# Patient Record
Sex: FEMALE | Race: BLACK OR AFRICAN AMERICAN | NOT HISPANIC OR LATINO | Employment: UNEMPLOYED | URBAN - METROPOLITAN AREA
[De-identification: names, ages, dates, MRNs, and addresses within clinical notes are randomized per-mention and may not be internally consistent; named-entity substitution may affect disease eponyms.]

---

## 2017-01-16 ENCOUNTER — OFFICE VISIT (OUTPATIENT)
Dept: FAMILY MEDICINE | Facility: CLINIC | Age: 42
End: 2017-01-16
Payer: COMMERCIAL

## 2017-01-16 VITALS
SYSTOLIC BLOOD PRESSURE: 160 MMHG | OXYGEN SATURATION: 99 % | HEART RATE: 73 BPM | HEIGHT: 65 IN | WEIGHT: 185 LBS | TEMPERATURE: 97 F | BODY MASS INDEX: 30.82 KG/M2 | DIASTOLIC BLOOD PRESSURE: 104 MMHG

## 2017-01-16 DIAGNOSIS — F51.01 PRIMARY INSOMNIA: ICD-10-CM

## 2017-01-16 DIAGNOSIS — N70.93 TUBO-OVARIAN ABSCESS: ICD-10-CM

## 2017-01-16 DIAGNOSIS — Z23 NEED FOR PROPHYLACTIC VACCINATION AND INOCULATION AGAINST INFLUENZA: ICD-10-CM

## 2017-01-16 DIAGNOSIS — F17.200 NEEDS SMOKING CESSATION EDUCATION: ICD-10-CM

## 2017-01-16 DIAGNOSIS — T81.31XA DEHISCENCE OF INCISION, INITIAL ENCOUNTER: Primary | ICD-10-CM

## 2017-01-16 DIAGNOSIS — I10 HYPERTENSION GOAL BP (BLOOD PRESSURE) < 140/90: ICD-10-CM

## 2017-01-16 PROCEDURE — 99214 OFFICE O/P EST MOD 30 MIN: CPT | Performed by: FAMILY MEDICINE

## 2017-01-16 RX ORDER — DOXYCYCLINE 100 MG/1
100 CAPSULE ORAL 2 TIMES DAILY
Qty: 20 CAPSULE | Refills: 0 | Status: SHIPPED | OUTPATIENT
Start: 2017-01-16 | End: 2017-01-26

## 2017-01-16 RX ORDER — ESZOPICLONE 2 MG/1
2 TABLET, FILM COATED ORAL
Qty: 30 TABLET | Refills: 1 | Status: SHIPPED | OUTPATIENT
Start: 2017-01-16 | End: 2017-03-29

## 2017-01-16 NOTE — MR AVS SNAPSHOT
After Visit Summary   1/16/2017    Jennifer Gonsalez    MRN: [id number]           Patient Information     Date Of Birth          1975        Visit Information        Provider Department      1/16/2017 6:40 PM Myla Davies MD [facility]        Today's Diagnoses     Dehiscence of incision, initial encounter    -  1     Need for prophylactic vaccination and inoculation against influenza         Needs smoking cessation education         Hypertension goal BP (blood pressure) < 140/90         Primary insomnia           Care Instructions    Based on your medical history and these are the current health maintenance or preventive care services that you are due for (some may have been done at this visit)  Health Maintenance Due   Topic Date Due     INFLUENZA VACCINE (SYSTEM ASSIGNED)  09/01/2016       At [facility], we strive to deliver an exceptional experience to you, every time we see you.    If you receive a survey in the mail, please send us back your thoughts. We really do value your feedback.    Your care team's suggested websites for health information:  Www.STinser.org : Up to date and easily searchable information on multiple topics.  Www.medlineplus.gov : medication info, interactive tutorials, watch real surgeries online  Www.familydoctor.org : good info from the Academy of Family Physicians  Www.cdc.gov : public health info, travel advisories, epidemics (H1N1)  Www.aap.org : children's health info, normal development, vaccinations  Www.health.state[place] : [place] dept of health, public health issues in [place], N1N1    How to contact your care team:   Team Danielle/Spirit [phone]         Pharmacy [phone]    Dr. Mujica, Ellen Martinez PA-C, Dr. Steinberg, Erin Corrales APRN CNP, Mihaela Hobson PA-C, Dr. Rader, and Andria Mirza, CHRIS    Team RNs: Morena & Jing      Clinic hours  M-Th 7 am-7 pm   Fri 7 am-5 pm.   Urgent  "care M-F 11 am-9 pm,   Sat/Sun 9 am-5 pm.  Pharmacy M-Th 8 am-8 pm Fri 8 am-6 pm  Sat/Sun 9 am-5 pm.     All password changes, disabled accounts, or ID changes in Technitrol/MyHealth will be done by our Access Services Department.    If you need help with your account or password, call: [phone]. Clinic staff no longer has the ability to change passwords.             Follow-ups after your visit        Additional Services     [facility] Referral Smoking Cessation       [place] online at [email or web address]                  Who to contact     If you have questions or need follow up information about today's clinic visit or your schedule please contact [facility] directly at [phone].  Normal or non-critical lab and imaging results will be communicated to you by MyChart, letter or phone within 4 business days after the clinic has received the results. If you do not hear from us within 7 days, please contact the clinic through Derivixhart or phone. If you have a critical or abnormal lab result, we will notify you by phone as soon as possible.  Submit refill requests through Technitrol or call your pharmacy and they will forward the refill request to us. Please allow 3 business days for your refill to be completed.          Additional Information About Your Visit        Care EveryWhere ID     This is your Care EveryWhere ID. This could be used by other organizations to access your [facility] medical records  [id number]        Your Vitals Were     Pulse Temperature Height BMI (Body Mass Index) Pulse Oximetry Last Period    73 97  F (36.1  C) (Oral) 5' 4.7\" (1.643 m) 31.09 kg/m2 99% 09/09/2016    Breastfeeding?                   No            Blood Pressure from Last 3 Encounters:   01/16/17 160/104   12/07/16 158/100   10/13/16 163/101    Weight from Last 3 Encounters:   01/16/17 185 lb (83.915 kg)   12/07/16 187 lb 6.4 oz (85.004 kg)   10/13/16 188 lb (85.276 kg)              We Performed the " Following     NOVU Referral Smoking Cessation          Today's Medication Changes          These changes are accurate as of: 1/16/17  6:57 PM.  If you have any questions, ask your nurse or doctor.               Start taking these medicines.        Dose/Directions    chlorhexidine 4 % liquid   Commonly known as:  HIBICLENS   Used for:  Dehiscence of incision, initial encounter   Started by:  Myla Davies MD        Apply topically daily as needed for wound care   Quantity:  480 mL   Refills:  1       doxycycline 100 MG capsule   Commonly known as:  VIBRAMYCIN   Used for:  Dehiscence of incision, initial encounter   Started by:  Myla Davies MD        Dose:  100 mg   Take 1 capsule (100 mg) by mouth 2 times daily for 10 days   Quantity:  20 capsule   Refills:  0            Where to get your medicines      These medications were sent to [facility] [place] - [address]  [address], [facility] [address]     Phone:  [phone]    - chlorhexidine 4 % liquid  - doxycycline 100 MG capsule      Some of these will need a paper prescription and others can be bought over the counter.  Ask your nurse if you have questions.     Bring a paper prescription for each of these medications    - eszopiclone 2 MG tablet             Primary Care Provider Office Phone # Fax #    Myla Obrien [phone]       [facility] [address]  [facility] [address]        Thank you!     Thank you for choosing [facility]  for your care. Our goal is always to provide you with excellent care. Hearing back from our patients is one way we can continue to improve our services. Please take a few minutes to complete the written survey that you may receive in the mail after your visit with us. Thank you!             Your Updated Medication List - Protect others around you: Learn how to safely use,  store and throw away your medicines at www.disposemymeds.org.          This list is accurate as of: 1/16/17  6:57 PM.  Always use your most recent med list.                   Brand Name Dispense Instructions for use    * albuterol 108 (90 BASE) MCG/ACT Inhaler    PROAIR HFA/PROVENTIL HFA/VENTOLIN HFA    1 Inhaler    Inhale 2 puffs into the lungs every 6 hours as needed for shortness of breath / dyspnea       * albuterol (2.5 MG/3ML) 0.083% neb solution     60 vial    Take 1 vial (2.5 mg) by nebulization every 6 hours as needed for shortness of breath / dyspnea       chlorhexidine 4 % liquid    HIBICLENS    480 mL    Apply topically daily as needed for wound care       doxycycline 100 MG capsule    VIBRAMYCIN    20 capsule    Take 1 capsule (100 mg) by mouth 2 times daily for 10 days       eszopiclone 2 MG tablet    LUNESTA    30 tablet    Take 1 tablet (2 mg) by mouth nightly as needed for sleep       lisinopril-hydrochlorothiazide 10-12.5 MG per tablet    PRINZIDE/ZESTORETIC    90 tablet    Take 1 tablet by mouth every morning       * Notice:  This list has 2 medication(s) that are the same as other medications prescribed for you. Read the directions carefully, and ask your doctor or other care provider to review them with you.

## 2017-01-17 ENCOUNTER — TELEPHONE (OUTPATIENT)
Dept: FAMILY MEDICINE | Facility: CLINIC | Age: 42
End: 2017-01-17

## 2017-01-17 DIAGNOSIS — I10 HYPERTENSION GOAL BP (BLOOD PRESSURE) < 140/90: Primary | ICD-10-CM

## 2017-01-17 NOTE — PROGRESS NOTES
"  SUBJECTIVE:                                                    Jennifer Gonsalez is a 41 year old female who presents to clinic today for the following health issues:        Concern - post-op Incision       Description:   Infected incision site    Intensity: moderate    Progression of Symptoms:  same    Accompanying Signs & Symptoms:  no       Previous history of similar problem:   no    Precipitating factors:   Worsened by: n/a    Alleviating factors:  Improved by: n/a       PID with tuboovarian abscess over 6 months ago.  Has surgery and was in ICU and hospital with subsequent lung infection.  For last 1 day has had drainage from right pelvic incision.  No fever  HTN - has not been taking medication.  Insomnia - stable with prn lunesta.  No side effects.    Problem list and histories reviewed & adjusted, as indicated.  Additional history: as documented    Problem list, Medication list, Allergies, and Medical/Social/Surgical histories reviewed in EPIC and updated as appropriate.    ROS:  Constitutional, HEENT, cardiovascular, pulmonary, GI, , musculoskeletal, neuro, skin, endocrine and psych systems are negative, except as otherwise noted.    OBJECTIVE:                                                    /104 mmHg  Pulse 73  Temp(Src) 97  F (36.1  C) (Oral)  Ht 5' 4.7\" (1.643 m)  Wt 185 lb (83.915 kg)  BMI 31.09 kg/m2  SpO2 99%  LMP 09/09/2016  Breastfeeding? No  Body mass index is 31.09 kg/(m^2).  GENERAL: healthy, alert and no distress  NECK: no adenopathy, no asymmetry, masses, or scars and thyroid normal to palpation  RESP: lungs clear to auscultation - no rales, rhonchi or wheezes  CV: regular rate and rhythm, normal S1 S2, no S3 or S4, no murmur, click or rub, no peripheral edema and peripheral pulses strong  ABDOMEN: soft, nontender, no hepatosplenomegaly, no masses and bowel sounds normal  MS: no gross musculoskeletal defects noted, no edema  SKIN: yellow white drainage from right edge of " lower abdominal surgical scar.    Diagnostic Test Results:  none      ASSESSMENT/PLAN:                                                    1. Need for prophylactic vaccination and inoculation against influenza  refused    2. Needs smoking cessation education  Information given  - NOVU Referral Smoking Cessation    3. Dehiscence of incision, initial encounter  Topical cleaning and start abx.  - chlorhexidine (HIBICLENS) 4 % liquid; Apply topically daily as needed for wound care  Dispense: 480 mL; Refill: 1  - doxycycline (VIBRAMYCIN) 100 MG capsule; Take 1 capsule (100 mg) by mouth 2 times daily for 10 days  Dispense: 20 capsule; Refill: 0    4. Hypertension goal BP (blood pressure) < 140/90  Not controlled due to compliance - discuss life and disability risk.  Patient will  medication today.    5. Primary insomnia  Refilled  - eszopiclone (LUNESTA) 2 MG tablet; Take 1 tablet (2 mg) by mouth nightly as needed for sleep  Dispense: 30 tablet; Refill: 1    6. Tubo-ovarian abscess  Secondary effects.    The uses and side effects, including black box warnings as appropriate, were discussed in detail.  All patient questions were answered.  The patient was instructed to call immediately if any side effects developed.       FUTURE APPOINTMENTS:       - Follow-up visit in 3 days if not improved or in 2 weeks for ancillary BP check.    Myla Obrien MD  [facility]

## 2017-01-17 NOTE — PATIENT INSTRUCTIONS
Based on your medical history and these are the current health maintenance or preventive care services that you are due for (some may have been done at this visit)  Health Maintenance Due   Topic Date Due     INFLUENZA VACCINE (SYSTEM ASSIGNED)  09/01/2016       At [facility], we strive to deliver an exceptional experience to you, every time we see you.    If you receive a survey in the mail, please send us back your thoughts. We really do value your feedback.    Your care team's suggested websites for health information:  Www.XL Hybrids.org : Up to date and easily searchable information on multiple topics.  Www.medlineplus.gov : medication info, interactive tutorials, watch real surgeries online  Www.familydoctor.org : good info from the Academy of Family Physicians  Www.cdc.gov : public health info, travel advisories, epidemics (H1N1)  Www.aap.org : children's health info, normal development, vaccinations  Www.health.[facility].[place].us : [place] dept of health, public health issues in [place], N1N1    How to contact your care team:   Team Danielle/Spirit [phone]         Pharmacy [phone]    Dr. Mujica, Ellen Martinez PA-C, Dr. Steinberg, Erin SALAS CNP, Mihaela Hobson PA-C, Dr. Rader, and Andria Mirza CNP    Team RNs: Morena Ch      Clinic hours  M-Th 7 am-7 pm   Fri 7 am-5 pm.   Urgent care M-F 11 am-9 pm,   Sat/Sun 9 am-5 pm.  Pharmacy M-Th 8 am-8 pm Fri 8 am-6 pm  Sat/Sun 9 am-5 pm.     All password changes, disabled accounts, or ID changes in Fashiolista/MyHealth will be done by our Access Services Department.    If you need help with your account or password, call: [phone]. Clinic staff no longer has the ability to change passwords.

## 2017-01-17 NOTE — NURSING NOTE
"Chief Complaint   Patient presents with     Surgical Followup       Initial /103 mmHg  Pulse 73  Temp(Src) 97  F (36.1  C) (Oral)  Ht 5' 4.7\" (1.643 m)  Wt 185 lb (83.915 kg)  BMI 31.09 kg/m2  SpO2 99%  LMP 09/09/2016  Breastfeeding? No Estimated body mass index is 31.09 kg/(m^2) as calculated from the following:    Height as of this encounter: 5' 4.7\" (1.643 m).    Weight as of this encounter: 185 lb (83.915 kg).  BP completed using cuff size: jael Haynes MA      "

## 2017-01-17 NOTE — TELEPHONE ENCOUNTER
Prior Auth Needed  Drug: Lunesta 2mg  Insurance: [employer]  ID Number: [id number]  Phone Number: [phone]    CHARLIE PAPPAS REQ CALL [phone] MAXIMUM 025 DAYS QUANTITY OF 15.000    Please do not close encounter until Prior Auth is approved or denied    Thank You,   Benito Newman  [phone]

## 2017-01-18 NOTE — TELEPHONE ENCOUNTER
SHERLEY JACLYN ([place]: [id number])   [id number]  Lunesta 2MG tablets  Status: PA Request  Created: January 18th, 2017  Sent: January 18th, 2017  MICKY Haynes MA

## 2017-01-19 NOTE — TELEPHONE ENCOUNTER
Received a fax from [employer] to deny the PA for Lunesta.     Will forward message and fax to provider to  Review and inform if appeal to be sent.     Dina Asif CMA

## 2017-01-20 NOTE — TELEPHONE ENCOUNTER
Pt notified of note below, she verbalized understanding.  Pt is requesting a script for a bp machine, our pharmacy does not carry a bp machine.  Routing to provider to advise.  Hilary Crain MA

## 2017-01-24 ENCOUNTER — TELEPHONE (OUTPATIENT)
Dept: FAMILY MEDICINE | Facility: CLINIC | Age: 42
End: 2017-01-24

## 2017-01-24 NOTE — TELEPHONE ENCOUNTER
Just tried running script for Lunesta 2mg tabs, now the reject is saying drug is non-formulary.  MED[employer] PMAP  Member # [id number]  Phone [phone]  Thank you very kindly!  Zahida Lujan, [facility] [place]

## 2017-01-24 NOTE — TELEPHONE ENCOUNTER
PRODUCT NOT ON FORMULARY  Drug: lunesta 2mg  Insurance: MEDICA [facility]  ID: [id number]  Phone Number: [phone]    Please do not close encounter until prior auth is approved or denied.    Thank you very kindly!  Zahida Lujan [facility] [place]

## 2017-01-31 NOTE — TELEPHONE ENCOUNTER
I checked status of PA over cover my meds and additional info needed. Info added and PA request sent to [facility] via cover my meds. We should receive and outcome within 24 hours. [place], CMA

## 2017-01-31 NOTE — TELEPHONE ENCOUNTER
Received  Notice of Denial.  FOR: pharmacy Non- Formulary.  Informed pharmacy.  Sent to PCP to advise.    MICKY Haynes MA

## 2017-02-01 NOTE — TELEPHONE ENCOUNTER
Notify patient that is seems her insurance will not cover the lunesta at all.  Schedule appt to discuss next steps.  She should also contact her insurance to see what is on her formulary before the appt.

## 2017-02-27 ENCOUNTER — TELEPHONE (OUTPATIENT)
Dept: FAMILY MEDICINE | Facility: CLINIC | Age: 42
End: 2017-02-27

## 2017-02-27 NOTE — LETTER
[facility]   [address]  [facility] [address]      February 27, 2017      Jennifer Gonsalez  [address]  [facility] [address]        Dear Jennifer,      Due to your last visit with us, we would like you to come in  for a blood pressure recheck on our Ancillary Schedule.  Your health is important to us.      Please call our office or request an appointment through [employer] at your earliest convenience.  You have multiple appointment options available to you, including no cost, low cost and convenient hours.  If you are interested in one of these options, call to speak with one of our nurses or medical assistants, to see what would be the most appropriate for you.      If you have any questions or concerns, please reach out to us through [employer] or by calling our clinic at [phone].          Sincerely,    Team Spirit    [facility]

## 2017-02-28 NOTE — TELEPHONE ENCOUNTER
Panel Management Review        Summary:    Patient is due/failing the following:   BP CHECK    Action needed:   Ancillary Blood Pressure Check     Type of outreach:    Sent letter.    Questions for provider review:    None                                                                                                                                    Charlene Nj CMA

## 2017-03-13 ENCOUNTER — OFFICE VISIT (OUTPATIENT)
Dept: FAMILY MEDICINE | Facility: CLINIC | Age: 42
End: 2017-03-13
Payer: COMMERCIAL

## 2017-03-13 VITALS
OXYGEN SATURATION: 99 % | WEIGHT: 189 LBS | BODY MASS INDEX: 31.49 KG/M2 | TEMPERATURE: 97 F | HEART RATE: 68 BPM | DIASTOLIC BLOOD PRESSURE: 90 MMHG | SYSTOLIC BLOOD PRESSURE: 140 MMHG | HEIGHT: 65 IN

## 2017-03-13 DIAGNOSIS — T81.31XD WOUND DEHISCENCE, SUBSEQUENT ENCOUNTER: Primary | ICD-10-CM

## 2017-03-13 DIAGNOSIS — I10 HYPERTENSION GOAL BP (BLOOD PRESSURE) < 140/90: ICD-10-CM

## 2017-03-13 PROCEDURE — 80048 BASIC METABOLIC PNL TOTAL CA: CPT | Performed by: FAMILY MEDICINE

## 2017-03-13 PROCEDURE — 36415 COLL VENOUS BLD VENIPUNCTURE: CPT | Performed by: FAMILY MEDICINE

## 2017-03-13 PROCEDURE — 99214 OFFICE O/P EST MOD 30 MIN: CPT | Performed by: FAMILY MEDICINE

## 2017-03-13 NOTE — LETTER
[facility]   [address]  [place] [address]      March 16, 2017      Jennifer Gonsalez  [address]  [place] [address]            Ms. Gonsalez,    All of your labs were normal for you.    Please contact the clinic if you have additional questions.  Thank you.    Sincerely,    Myla Obrien/ak        Results for orders placed or performed in visit on 03/13/17   BASIC METABOLIC PANEL   Result Value Ref Range    Sodium 141 133 - 144 mmol/L    Potassium 3.7 3.4 - 5.3 mmol/L    Chloride 104 94 - 109 mmol/L    Carbon Dioxide 28 20 - 32 mmol/L    Anion Gap 9 3 - 14 mmol/L    Glucose 87 70 - 99 mg/dL    Urea Nitrogen 12 7 - 30 mg/dL    Creatinine 0.76 0.52 - 1.04 mg/dL    GFR Estimate 84 >60 mL/min/1.7m2    GFR Estimate If Black >90   GFR Calc   >60 mL/min/1.7m2    Calcium 9.6 8.5 - 10.1 mg/dL

## 2017-03-13 NOTE — NURSING NOTE
"Chief Complaint   Patient presents with     Hypertension       Initial /90  Pulse 68  Temp 97  F (36.1  C) (Oral)  Ht 5' 4.7\" (1.643 m)  Wt 189 lb (85.7 kg)  LMP 09/09/2016  SpO2 99%  Breastfeeding? No  BMI 31.74 kg/m2 Estimated body mass index is 31.74 kg/(m^2) as calculated from the following:    Height as of this encounter: 5' 4.7\" (1.643 m).    Weight as of this encounter: 189 lb (85.7 kg).  Medication Reconciliation: complete     Charlene Nj CMA      "

## 2017-03-13 NOTE — MR AVS SNAPSHOT
After Visit Summary   3/13/2017    Jennifer Gonsalez    MRN: [id number]           Patient Information     Date Of Birth          1975        Visit Information        Provider Department      3/13/2017 6:20 PM Myla Davies MD [facility]        Today's Diagnoses     Wound dehiscence, subsequent encounter    -  1    Hypertension goal BP (blood pressure) < 140/90           Follow-ups after your visit        Additional Services     GENERAL SURG ADULT REFERRAL       Your provider has referred you to: FM[facility]: [facility] [phone]   [email or web address]    Please be aware that coverage of these services is subject to the terms and limitations of your health insurance plan.  Call member services at your health plan with any benefit or coverage questions.      Please bring the following with you to your appointment:    (1) Any X-Rays, CTs or MRIs which have been performed.  Contact the facility where they were done to arrange for  prior to your scheduled appointment.   (2) List of current medications   (3) This referral request   (4) Any documents/labs given to you for this referral                  Follow-up notes from your care team     Return in about 2 weeks (around 3/27/2017) for BP Recheck.      Who to contact     If you have questions or need follow up information about today's clinic visit or your schedule please contact [facility] directly at [phone].  Normal or non-critical lab and imaging results will be communicated to you by MyChart, letter or phone within 4 business days after the clinic has received the results. If you do not hear from us within 7 days, please contact the clinic through MyChart or phone. If you have a critical or abnormal lab result, we will notify you by phone as soon as possible.  Submit refill requests through Spinel[employer] or call your pharmacy and  "they will forward the refill request to us. Please allow 3 business days for your refill to be completed.          Additional Information About Your Visit        MyChart Information     Scint-X lets you send messages to your doctor, view your test results, renew your prescriptions, schedule appointments and more. To sign up, go to [email or web address] . Click on \"Log in\" on the left side of the screen, which will take you to the Welcome page. Then click on \"Sign up Now\" on the right side of the page.     You will be asked to enter the access code listed below, as well as some personal information. Please follow the directions to create your username and password.     Your access code is: [email or web address]  Expires: 2017  7:11 PM     Your access code will  in 90 days. If you need help or a new code, please call your [place] clinic or [phone].        Care EveryWhere ID     This is your [facility] EveryWhere ID. This could be used by other organizations to access your [place] medical records  [id number]        Your Vitals Were     Pulse Temperature Height Last Period Pulse Oximetry Breastfeeding?    68 97  F (36.1  C) (Oral) 5' 4.7\" (1.643 m) 2016 99% No    BMI (Body Mass Index)                   31.74 kg/m2            Blood Pressure from Last 3 Encounters:   17 140/90   17 (!) 160/104   16 (!) 158/100    Weight from Last 3 Encounters:   17 189 lb (85.7 kg)   17 185 lb (83.9 kg)   16 187 lb 6.4 oz (85 kg)              We Performed the Following     BASIC METABOLIC PANEL     GENERAL SURG ADULT REFERRAL        Primary Care Provider Office Phone # Fax #    Myla Obrien [phone]       [facility] [address]  [facility] [address]        Thank you!     Thank you for choosing [facility]  for your care. Our goal is always to provide you with excellent care. Hearing back from our patients is " one way we can continue to improve our services. Please take a few minutes to complete the written survey that you may receive in the mail after your visit with us. Thank you!             Your Updated Medication List - Protect others around you: Learn how to safely use, store and throw away your medicines at www.disposemymeds.org.          This list is accurate as of: 3/13/17  7:11 PM.  Always use your most recent med list.                   Brand Name Dispense Instructions for use    * albuterol 108 (90 BASE) MCG/ACT Inhaler    PROAIR HFA/PROVENTIL HFA/VENTOLIN HFA    1 Inhaler    Inhale 2 puffs into the lungs every 6 hours as needed for shortness of breath / dyspnea       * albuterol (2.5 MG/3ML) 0.083% neb solution     60 vial    Take 1 vial (2.5 mg) by nebulization every 6 hours as needed for shortness of breath / dyspnea       chlorhexidine 4 % liquid    HIBICLENS    480 mL    Apply topically daily as needed for wound care       eszopiclone 2 MG tablet    LUNESTA    30 tablet    Take 1 tablet (2 mg) by mouth nightly as needed for sleep       lisinopril-hydrochlorothiazide 10-12.5 MG per tablet    PRINZIDE/ZESTORETIC    90 tablet    Take 1 tablet by mouth every morning       order for DME     1 Device    Equipment being ordered: Blood pressure monitor.       * Notice:  This list has 2 medication(s) that are the same as other medications prescribed for you. Read the directions carefully, and ask your doctor or other care provider to review them with you.

## 2017-03-13 NOTE — PROGRESS NOTES
SUBJECTIVE:                                                    Jennifer Gonsalez is a 41 year old female who presents to clinic today for the following health issues:      Hypertension Follow-up      Outpatient blood pressures are not being checked.    Low Salt Diet: no added salt       Amount of exercise or physical activity: None    Problems taking medications regularly: No    Medication side effects: none  Diet: regular (no restrictions)    Concern: incision is still leaking on abdomen for months.      Problem list and histories reviewed & adjusted, as indicated.  Additional history: as documented    Patient Active Problem List   Diagnosis     Mild persistent asthma     CARDIOVASCULAR SCREENING; LDL GOAL LESS THAN 160     GERD (gastroesophageal reflux disease)     Hypertension goal BP (blood pressure) < 140/90     Seasonal allergic rhinitis     Acute reaction to stress     Tobacco use disorder     Non morbid obesity due to excess calories     Submental mass     Empyema (H)     Hormone replacement therapy (HRT)     Primary insomnia     Other iron deficiency anemia     Obesity with body mass index 30 or greater     Past Surgical History   Procedure Laterality Date     Mammoplasty reduction  2000     [place]     Hernia repair, umbilical  2014     Cholecystectomy, laporoscopic  2014     Cholecystectomy, Laparoscopic     C excis uterine fibroid,abd apprch  2014     Salpingo-oophorectomy bilateral  2016     Open BSO for bilateral TOA     Thoracentesis  2016     inflammatory pleural infusion       Social History   Substance Use Topics     Smoking status: Current Some Day Smoker     Packs/day: 0.20     Types: Cigarettes     Smokeless tobacco: Never Used      Comment: 1 cig per day     Alcohol use 0.0 oz/week     0 Standard drinks or equivalent per week      Comment: not often     History reviewed. No pertinent family history.        Reviewed and updated as needed this visit by clinical staff       Reviewed and updated as  "needed this visit by Provider         ROS:  Constitutional, HEENT, cardiovascular, pulmonary, gi and gu systems are negative, except as otherwise noted.    OBJECTIVE:                                                    /90  Pulse 68  Temp 97  F (36.1  C) (Oral)  Ht 5' 4.7\" (1.643 m)  Wt 189 lb (85.7 kg)  LMP 09/09/2016  SpO2 99%  Breastfeeding? No  BMI 31.74 kg/m2  Body mass index is 31.74 kg/(m^2).  GENERAL: healthy, alert and no distress  NECK: no adenopathy, no asymmetry, masses, or scars and thyroid normal to palpation  RESP: lungs clear to auscultation - no rales, rhonchi or wheezes  CV: regular rate and rhythm, normal S1 S2, no S3 or S4, no murmur, click or rub, no peripheral edema and peripheral pulses strong  ABDOMEN: soft, nontender, without hepatosplenomegaly or masses, bowel sounds normal and open area on right lower abd with tissue sticking out  MS: no gross musculoskeletal defects noted, no edema    Diagnostic Test Results:  none      ASSESSMENT/PLAN:                                                    1. Wound dehiscence, subsequent encounter  Referral for treatment  - GENERAL SURG ADULT REFERRAL    2. Hypertension goal BP (blood pressure) < 140/90  Check labs and restart medication.  - BASIC METABOLIC PANEL    FUTURE APPOINTMENTS:       - Follow-up visit in 2 weeks for bp recheck.    Myla Obrien MD  [facility]    "

## 2017-03-14 LAB
ANION GAP SERPL CALCULATED.3IONS-SCNC: 9 MMOL/L (ref 3–14)
BUN SERPL-MCNC: 12 MG/DL (ref 7–30)
CALCIUM SERPL-MCNC: 9.6 MG/DL (ref 8.5–10.1)
CHLORIDE SERPL-SCNC: 104 MMOL/L (ref 94–109)
CO2 SERPL-SCNC: 28 MMOL/L (ref 20–32)
CREAT SERPL-MCNC: 0.76 MG/DL (ref 0.52–1.04)
GFR SERPL CREATININE-BSD FRML MDRD: 84 ML/MIN/1.7M2
GLUCOSE SERPL-MCNC: 87 MG/DL (ref 70–99)
POTASSIUM SERPL-SCNC: 3.7 MMOL/L (ref 3.4–5.3)
SODIUM SERPL-SCNC: 141 MMOL/L (ref 133–144)

## 2017-03-14 ASSESSMENT — ASTHMA QUESTIONNAIRES: ACT_TOTALSCORE: 23

## 2017-03-16 ENCOUNTER — OFFICE VISIT (OUTPATIENT)
Dept: SURGERY | Facility: CLINIC | Age: 42
End: 2017-03-16
Payer: COMMERCIAL

## 2017-03-16 ENCOUNTER — RADIANT APPOINTMENT (OUTPATIENT)
Dept: CT IMAGING | Facility: CLINIC | Age: 42
End: 2017-03-16
Attending: SURGERY
Payer: COMMERCIAL

## 2017-03-16 VITALS
HEART RATE: 87 BPM | WEIGHT: 187 LBS | HEIGHT: 64 IN | DIASTOLIC BLOOD PRESSURE: 97 MMHG | SYSTOLIC BLOOD PRESSURE: 163 MMHG | BODY MASS INDEX: 31.92 KG/M2

## 2017-03-16 DIAGNOSIS — L98.8 DRAINING CUTANEOUS SINUS TRACT: Primary | ICD-10-CM

## 2017-03-16 DIAGNOSIS — L98.8 DRAINING CUTANEOUS SINUS TRACT: ICD-10-CM

## 2017-03-16 PROCEDURE — 99243 OFF/OP CNSLTJ NEW/EST LOW 30: CPT | Performed by: SURGERY

## 2017-03-16 PROCEDURE — 74177 CT ABD & PELVIS W/CONTRAST: CPT | Performed by: RADIOLOGY

## 2017-03-16 RX ORDER — IOPAMIDOL 755 MG/ML
115 INJECTION, SOLUTION INTRAVASCULAR ONCE
Status: COMPLETED | OUTPATIENT
Start: 2017-03-16 | End: 2017-03-16

## 2017-03-16 RX ADMIN — IOPAMIDOL 115 ML: 755 INJECTION, SOLUTION INTRAVASCULAR at 15:18

## 2017-03-16 NOTE — MR AVS SNAPSHOT
"              After Visit Summary   3/16/2017    Jennifer Gonsalez    MRN: [id number]           Patient Information     Date Of Birth          1975        Visit Information        Provider Department      3/16/2017 9:00 AM Rakan Mcclain DO [facility]        Today's Diagnoses     Draining cutaneous sinus tract    -  1       Follow-ups after your visit        Who to contact     If you have questions or need follow up information about today's clinic visit or your schedule please contact [facility] directly at [phone].  Normal or non-critical lab and imaging results will be communicated to you by [employer], letter or phone within 4 business days after the clinic has received the results. If you do not hear from us within 7 days, please contact the clinic through [employer] or phone. If you have a critical or abnormal lab result, we will notify you by phone as soon as possible.  Submit refill requests through [employer] or call your pharmacy and they will forward the refill request to us. Please allow 3 business days for your refill to be completed.          Additional Information About Your Visit        MyChart Information     [employer] lets you send messages to your doctor, view your test results, renew your prescriptions, schedule appointments and more. To sign up, go to [email or web address] . Click on \"Log in\" on the left side of the screen, which will take you to the Welcome page. Then click on \"Sign up Now\" on the right side of the page.     You will be asked to enter the access code listed below, as well as some personal information. Please follow the directions to create your username and password.     Your access code is: [email or web address]  Expires: 2017  7:11 PM     Your access code will  in 90 days. If you need help or a new code, please call your [facility] or [phone].        Care EveryWhere ID     This is your Care EveryWhere ID. This could be used " "by other organizations to access your [facility] medical records  [id number]        Your Vitals Were     Pulse Height Last Period BMI (Body Mass Index)          87 1.626 m (5' 4\") 09/09/2016 32.1 kg/m2         Blood Pressure from Last 3 Encounters:   03/16/17 (!) 163/97   03/13/17 140/90   01/16/17 (!) 160/104    Weight from Last 3 Encounters:   03/16/17 84.8 kg (187 lb)   03/13/17 85.7 kg (189 lb)   01/16/17 83.9 kg (185 lb)               Primary Care Provider Office Phone # Fax #    Myla Shawna Obrien [phone]       [facility] [address] [place]  [facility] [phone]        Thank you!     Thank you for choosing [facility]  for your care. Our goal is always to provide you with excellent care. Hearing back from our patients is one way we can continue to improve our services. Please take a few minutes to complete the written survey that you may receive in the mail after your visit with us. Thank you!             Your Updated Medication List - Protect others around you: Learn how to safely use, store and throw away your medicines at www.disposemymeds.org.          This list is accurate as of: 3/16/17 11:59 PM.  Always use your most recent med list.                   Brand Name Dispense Instructions for use    * albuterol 108 (90 BASE) MCG/ACT Inhaler    PROAIR HFA/PROVENTIL HFA/VENTOLIN HFA    1 Inhaler    Inhale 2 puffs into the lungs every 6 hours as needed for shortness of breath / dyspnea       * albuterol (2.5 MG/3ML) 0.083% neb solution     60 vial    Take 1 vial (2.5 mg) by nebulization every 6 hours as needed for shortness of breath / dyspnea       chlorhexidine 4 % liquid    HIBICLENS    480 mL    Apply topically daily as needed for wound care       eszopiclone 2 MG tablet    LUNESTA    30 tablet    Take 1 tablet (2 mg) by mouth nightly as needed for sleep       lisinopril-hydrochlorothiazide 10-12.5 MG per tablet    PRINZIDE/ZESTORETIC    " 90 tablet    Take 1 tablet by mouth every morning       order for DME     1 Device    Equipment being ordered: Blood pressure monitor.       * Notice:  This list has 2 medication(s) that are the same as other medications prescribed for you. Read the directions carefully, and ask your doctor or other care provider to review them with you.

## 2017-03-16 NOTE — PROGRESS NOTES
Ms. Gonsalez,    All of your labs were normal for you.    Please contact the clinic if you have additional questions.  Thank you.    Sincerely,    Myla Obrien

## 2017-03-16 NOTE — NURSING NOTE
"Chief Complaint   Patient presents with     Abdominal Pain       Initial BP (!) 163/97  Pulse 87  Ht 5' 4\" (1.626 m)  Wt 187 lb (84.8 kg)  LMP 09/09/2016  BMI 32.1 kg/m2 Estimated body mass index is 32.1 kg/(m^2) as calculated from the following:    Height as of this encounter: 5' 4\" (1.626 m).    Weight as of this encounter: 187 lb (84.8 kg).  Medication Reconciliation: complete   Rhianna Salas Cma      "

## 2017-03-16 NOTE — Clinical Note
I had the pleasure of seeing Jennifer Gonsalez in the clinic today for her chronic draining sinus tract. She had a CT and has a fistula. She is scheduled for a follow up with me in a week and we will see how she wishes to proceed. I will let you know what she decides.  Thanks for allowing me the chance to participate in her care.  Sincerely,  Rakan Mcclain  General Surgery  Head and Neck/Endocrine Surgery

## 2017-03-16 NOTE — PROGRESS NOTES
I was asked to see Jennifer Gonsalez for chronic draining sinus by Myla Davies    HPI:  Patient is a 41 year old female with complaints of chronic draining sinus    Had fibroid surgery about 2 yrs ago. Pt developed an infection and had to have both ovaries removed in May. Pt developed pneumonia the day after being discharged and was in the hospital for two weeks. Pt developed a discharging wound on right corner of incision. Pt was taken to the OR and a drain was placed. Drain was removed a week later in June. Wound started leaking again in November.     Pt had a small subcentimeter stitch that came out of the wound sometime last year.    Greenish red fluid comes out. Sometimes it is just blood. Fluid is malodorous. Increased discharge when patient is very active. Discharge has been relatively steady. Wound is tender to touch. Pt has occasional generalized abdominal pain crampy.     Review Of Systems    General: Positive for occasional fever or chills  Skin: negative for masses or rashes  Ears/Nose/Throat: negative for, epistaxis, bleeding gums  Respiratory: No shortness of breath, dyspnea on exertion, cough, or hemoptysis  Cardiovascular: negative for and chest pain  Gastrointestinal: Positive for occasional nausea, and abdominal pain  Genitourinary: negative for and frequency  Neurologic: negative for focal weakness  Hematologic/Lymphatic/Immunologic: negative for, bleeding disorder and frequent infections  Endocrine: negative for, diabetes, polydipsia and polyuria      Past Medical History   Diagnosis Date     Chlamydia 2014     Dysmenorrhea      H/O transfusion of packed red blood cells 2016     HTN (hypertension), benign 2014     Leiomyoma of uterus 2013     Mild intermittent asthma      Obesity      TOA (tubo-ovarian abscess) 2016     Tobacco use disorder        Past Surgical History   Procedure Laterality Date     Mammoplasty reduction  2000     [place]     Hernia repair, umbilical  2014      Cholecystectomy, laporoscopic  2014     Cholecystectomy, Laparoscopic     [facility]     Salpingo-oophorectomy bilateral  2016     Open BSO for bilateral TOA     Thoracentesis  2016     inflammatory pleural infusion       Social History     Social History     Marital status: Single     Spouse name: angelina Keen     Number of children: 3     Years of education: N/A     Occupational History     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 No     Social History Narrative       Current Outpatient Prescriptions   Medication Sig Dispense Refill     order for DME Equipment being ordered: Blood pressure monitor. (Patient not taking: Reported on 3/13/2017) 1 Device 0     chlorhexidine (HIBICLENS) 4 % liquid Apply topically daily as needed for wound care (Patient not taking: Reported on 3/13/2017) 480 mL 1     eszopiclone (LUNESTA) 2 MG tablet Take 1 tablet (2 mg) by mouth nightly as needed for sleep (Patient not taking: Reported on 3/13/2017) 30 tablet 1     lisinopril-hydrochlorothiazide (PRINZIDE/ZESTORETIC) 10-12.5 MG per tablet Take 1 tablet by mouth every morning (Patient not taking: Reported on 3/13/2017) 90 tablet 1     albuterol (PROAIR HFA, PROVENTIL HFA, VENTOLIN HFA) 108 (90 BASE) MCG/ACT inhaler Inhale 2 puffs into the lungs every 6 hours as needed for shortness of breath / dyspnea 1 Inhaler 5     albuterol (2.5 MG/3ML) 0.083% nebulizer solution Take 1 vial (2.5 mg) by nebulization every 6 hours as needed for shortness of breath / dyspnea (Patient not taking: Reported on  "3/13/2017) 60 vial 6       Medications and history reviewed    Physical exam:  Vitals: BP (!) 163/97  Pulse 87  Ht 1.626 m (5' 4\")  Wt 84.8 kg (187 lb)  LMP 09/09/2016  BMI 32.1 kg/m2  BMI= Body mass index is 32.1 kg/(m^2).    Constitutional: healthy, alert and no distress  Eyes: Pupils round and equal, no icterus   ENT: Mucous membranes moist  Respiratory:  Non-labored respiration  Gastrointestinal: Abdomen soft, non-tender. BS normal. No masses, organomegaly  Incision is wel healed with a small 2mm opening on the right lateral aspect. Wound was probed and was about 3mm deep with no foreign material noted.  Musculoskeletal: No gross deformity  Neurologic: No gross focal deficits  Psychiatric: mentation appears normal and affect normal/bright  Hematologic/Lymphatic/Immunologic: No bruising noted  Skin: No lesions, rashes, erythema or jaundice noted      Assessment: Chronic draining sinus  Plan:   CT scan  Follow up in 2 weeks    Rakan Mcclain DO    "

## 2017-03-17 ENCOUNTER — TELEPHONE (OUTPATIENT)
Dept: FAMILY MEDICINE | Facility: CLINIC | Age: 42
End: 2017-03-17

## 2017-03-17 NOTE — TELEPHONE ENCOUNTER
Reason for Call:  Request for results:    Name of test or procedure: CT Abdomen Pelvis w Contrast    Date of test of procedure: 03/16/17    Location of the test or procedure: [facility]    OK to leave the result message on voice mail or with a family member? YES    Phone number Patient can be reached at:  Home number on file [phone] (home)    Additional comments: Pt calling for she came in for a CT scan results and would like a call back to discuss results.    Call taken on 3/17/2017 at 8:24 AM by Jean Menezes

## 2017-03-17 NOTE — TELEPHONE ENCOUNTER
Called and informed the patient of the information below. She will call and leave a message for Dr Mcclain to advise further.    Jing Johnson RN, [facility]

## 2017-03-17 NOTE — TELEPHONE ENCOUNTER
Routing this to Dr Mcclain but this should also go to his team as ordered by him.  CT appears to show some of her bowel is close to the skin and she may need surgery to correct this.

## 2017-03-23 ENCOUNTER — OFFICE VISIT (OUTPATIENT)
Dept: SURGERY | Facility: CLINIC | Age: 42
End: 2017-03-23
Payer: COMMERCIAL

## 2017-03-23 VITALS
SYSTOLIC BLOOD PRESSURE: 158 MMHG | DIASTOLIC BLOOD PRESSURE: 106 MMHG | BODY MASS INDEX: 32.44 KG/M2 | WEIGHT: 190 LBS | HEIGHT: 64 IN | HEART RATE: 64 BPM

## 2017-03-23 DIAGNOSIS — K63.2 COLOCUTANEOUS FISTULA: Primary | ICD-10-CM

## 2017-03-23 PROCEDURE — 99214 OFFICE O/P EST MOD 30 MIN: CPT | Performed by: SURGERY

## 2017-03-23 NOTE — NURSING NOTE
"Chief Complaint   Patient presents with     CT Results       Initial BP (!) 158/106  Pulse 64  Ht 5' 4\" (1.626 m)  Wt 190 lb (86.2 kg)  LMP 09/09/2016  BMI 32.61 kg/m2 Estimated body mass index is 32.61 kg/(m^2) as calculated from the following:    Height as of this encounter: 5' 4\" (1.626 m).    Weight as of this encounter: 190 lb (86.2 kg).  Medication Reconciliation: complete   Rhianna Salas Cma      "

## 2017-03-23 NOTE — PROGRESS NOTES
HPI: Patient is a 41 year old female here for follow up after a CT abd/pelvis. She had a fistula tract noted on CT.      Had fibroid surgery about 2 yrs ago. Pt developed an infection and had to have both ovaries removed in May. Pt developed pneumonia the day after being discharged and was in the hospital for two weeks. Pt developed a discharging wound on right corner of incision. Pt was taken to the OR and a drain was placed. Drain was removed a week later in June. Wound started leaking again in November.      Pt had a small subcentimeter stitch that came out of the wound sometime last year.     Greenish red fluid comes out. Sometimes it is just blood. Fluid is malodorous. Increased discharge when patient is very active. Discharge has been relatively steady. Wound is tender to touch. Pt has occasional generalized abdominal pain crampy.     General: negative for fever or chills  Skin: negative except wound as noted above  Ears/Nose/Throat: negative for, epistaxis, bleeding gums  Respiratory: No shortness of breath, dyspnea on exertion, cough, or hemoptysis  Cardiovascular: negative for and chest pain  Gastrointestinal: negative except as above  Genitourinary: negative for and frequency  Neurologic: negative for and local weakness  Hematologic/Lymphatic/Immunologic: negative for, bleeding disorder and frequent infections  Endocrine: negative for, diabetes, polydipsia and polyuria    Past Medical History:   Diagnosis Date     Chlamydia 2014     Dysmenorrhea      H/O transfusion of packed red blood cells 2016     HTN (hypertension), benign 2014     Leiomyoma of uterus 2013     Mild intermittent asthma      Obesity      TOA (tubo-ovarian abscess) 2016     Tobacco use disorder        Past Surgical History:   Procedure Laterality Date     C EXCIS UTERINE FIBROID,ABD APPRCH  2014     CHOLECYSTECTOMY, LAPOROSCOPIC  2014    Cholecystectomy, Laparoscopic     HERNIA REPAIR, UMBILICAL  2014     MAMMOPLASTY REDUCTION  2000     "[place]     SALPINGO-OOPHORECTOMY BILATERAL  2016    Open BSO for bilateral TOA     THORACENTESIS  2016    inflammatory pleural infusion       Social History     Social History     Marital status: Single     Spouse name: angelina Keen     Number of children: 3     Years of education: N/A     Occupational History     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 No     Social History Narrative       Current Outpatient Prescriptions   Medication Sig Dispense Refill     order for DME Equipment being ordered: Blood pressure monitor. (Patient not taking: Reported on 3/13/2017) 1 Device 0     chlorhexidine (HIBICLENS) 4 % liquid Apply topically daily as needed for wound care (Patient not taking: Reported on 3/13/2017) 480 mL 1     eszopiclone (LUNESTA) 2 MG tablet Take 1 tablet (2 mg) by mouth nightly as needed for sleep (Patient not taking: Reported on 3/13/2017) 30 tablet 1     lisinopril-hydrochlorothiazide (PRINZIDE/ZESTORETIC) 10-12.5 MG per tablet Take 1 tablet by mouth every morning (Patient not taking: Reported on 3/13/2017) 90 tablet 1     albuterol (PROAIR HFA, PROVENTIL HFA, VENTOLIN HFA) 108 (90 BASE) MCG/ACT inhaler Inhale 2 puffs into the lungs every 6 hours as needed for shortness of breath / dyspnea 1 Inhaler 5     albuterol (2.5 MG/3ML) 0.083% nebulizer solution Take 1 vial (2.5 mg) by nebulization every 6 hours as needed for shortness of breath / dyspnea (Patient not taking: Reported on 3/13/2017) 60 vial 6       Physical exam:   BP (!) 158/106  Pulse 64  Ht 1.626 m (5' 4\")  Wt 86.2 kg (190 " lb)  LMP 09/09/2016  BMI 32.61 kg/m2    Constitutional: healthy, alert and no distress  Eyes: Pupils round and equal, no icterus   ENT: Mucous membranes moist  Respiratory: Non-labored respiration  Gastrointestinal: Abdomen soft, non-tender. BS normal. No masses, organomegaly  Incision is wel healed with a small 2mm opening on the right lateral aspect. Skin surrounding the tract is soft.   Musculoskeletal: No gross deformity  Neurologic: No gross focal deficits  Psychiatric: mentation appears normal and affect normal/bright  Hematologic/Lymphatic/Immunologic: No bruising noted  Skin: No lesions, rashes, erythema or jaundice noted      CT scan show:  1. Colocutaneous fistula. An intermediate attenuation nodule adjacent  to the fistulous tract in the uterus may be related to the patient's  prior uterine surgery or may reflect a proteinaceous fluid collection.  2. Myomatous uterus.    Assessment: Colocutaneous fistula  Plan to do:     We will do a laparoscopic sigmoid resection with removal of fistula tract if and when the patient elects. We also discussed further observation and discussed how the likely long the fistula would close spontaneously at this point was low.     The risks benefits and alternatives of surgery were discussed with the patient including but not limited to pain, bleeding, infection, risks of general anesthesia (i.e. MI, CVA, PE, and death), and cosmetic deformity. Additionally we discussed the risk of anastomotic leak, further fistulas, removal of uterus and open surgery.     Rakan Mcclain

## 2017-03-23 NOTE — MR AVS SNAPSHOT
"              After Visit Summary   3/23/2017    Jennifer Gonsalez    MRN: [id number]           Patient Information     Date Of Birth          1975        Visit Information        Provider Department      3/23/2017 1:30 PM Rakan Mcclain DO [facility]        Today's Diagnoses     Colocutaneous fistula    -  1       Follow-ups after your visit        Who to contact     If you have questions or need follow up information about today's clinic visit or your schedule please contact [facility] directly at [phone].  Normal or non-critical lab and imaging results will be communicated to you by [employer], letter or phone within 4 business days after the clinic has received the results. If you do not hear from us within 7 days, please contact the clinic through [employer] or phone. If you have a critical or abnormal lab result, we will notify you by phone as soon as possible.  Submit refill requests through [employer] or call your pharmacy and they will forward the refill request to us. Please allow 3 business days for your refill to be completed.          Additional Information About Your Visit        MyChart Information     [employer] lets you send messages to your doctor, view your test results, renew your prescriptions, schedule appointments and more. To sign up, go to [email or web address] . Click on \"Log in\" on the left side of the screen, which will take you to the Welcome page. Then click on \"Sign up Now\" on the right side of the page.     You will be asked to enter the access code listed below, as well as some personal information. Please follow the directions to create your username and password.     Your access code is: [email or web address]  Expires: 2017  7:11 PM     Your access code will  in 90 days. If you need help or a new code, please call your [facility] or [phone].        Care EveryWhere ID     This is your Care EveryWhere ID. This could be used by other " "organizations to access your [facility] medical records  [id number]        Your Vitals Were     Pulse Height Last Period BMI (Body Mass Index)          64 1.626 m (5' 4\") 09/09/2016 32.61 kg/m2         Blood Pressure from Last 3 Encounters:   03/23/17 (!) 158/106   03/16/17 (!) 163/97   03/13/17 140/90    Weight from Last 3 Encounters:   03/23/17 86.2 kg (190 lb)   03/16/17 84.8 kg (187 lb)   03/13/17 85.7 kg (189 lb)              Today, you had the following     No orders found for display       Primary Care Provider Office Phone # Fax #    Myla Shawna Obrien [phone]       [facility] [address] [place]  [facility] [address]        Thank you!     Thank you for choosing [facility]  for your care. Our goal is always to provide you with excellent care. Hearing back from our patients is one way we can continue to improve our services. Please take a few minutes to complete the written survey that you may receive in the mail after your visit with us. Thank you!             Your Updated Medication List - Protect others around you: Learn how to safely use, store and throw away your medicines at www.disposemymeds.org.          This list is accurate as of: 3/23/17  2:53 PM.  Always use your most recent med list.                   Brand Name Dispense Instructions for use    * albuterol 108 (90 BASE) MCG/ACT Inhaler    PROAIR HFA/PROVENTIL HFA/VENTOLIN HFA    1 Inhaler    Inhale 2 puffs into the lungs every 6 hours as needed for shortness of breath / dyspnea       * albuterol (2.5 MG/3ML) 0.083% neb solution     60 vial    Take 1 vial (2.5 mg) by nebulization every 6 hours as needed for shortness of breath / dyspnea       chlorhexidine 4 % liquid    HIBICLENS    480 mL    Apply topically daily as needed for wound care       eszopiclone 2 MG tablet    LUNESTA    30 tablet    Take 1 tablet (2 mg) by mouth nightly as needed for sleep       " lisinopril-hydrochlorothiazide 10-12.5 MG per tablet    PRINZIDE/ZESTORETIC    90 tablet    Take 1 tablet by mouth every morning       order for DME     1 Device    Equipment being ordered: Blood pressure monitor.       * Notice:  This list has 2 medication(s) that are the same as other medications prescribed for you. Read the directions carefully, and ask your doctor or other care provider to review them with you.

## 2017-03-23 NOTE — Clinical Note
I had the pleasure of seeing Jennifer Gonsalez in the clinic today for her chronic draining sinus. 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 She is thinking about surgery and will get back to us when she decides.   Thanks for allowing me the chance to participate in her care.  Sincerely,  Rakan Mcclain  General Surgery  Head and Neck/Endocrine Surgery

## 2017-03-24 ENCOUNTER — TELEPHONE (OUTPATIENT)
Dept: SURGERY | Facility: CLINIC | Age: 42
End: 2017-03-24

## 2017-03-24 NOTE — TELEPHONE ENCOUNTER
Returning patients call spoke with her and scheduled her for [place]07/[place] [place] am just verifying with Dr. Mcclain and Dr. Castillo that the date and time is ok with their schedules.[facility]

## 2017-03-29 ENCOUNTER — OFFICE VISIT (OUTPATIENT)
Dept: FAMILY MEDICINE | Facility: CLINIC | Age: 42
End: 2017-03-29
Payer: COMMERCIAL

## 2017-03-29 ENCOUNTER — TELEPHONE (OUTPATIENT)
Dept: PHARMACY | Facility: CLINIC | Age: 42
End: 2017-03-29

## 2017-03-29 VITALS
HEART RATE: 74 BPM | TEMPERATURE: 97 F | DIASTOLIC BLOOD PRESSURE: 86 MMHG | RESPIRATION RATE: 17 BRPM | HEIGHT: 64 IN | BODY MASS INDEX: 32.27 KG/M2 | SYSTOLIC BLOOD PRESSURE: 138 MMHG | OXYGEN SATURATION: 100 % | WEIGHT: 189 LBS

## 2017-03-29 DIAGNOSIS — F17.200 TOBACCO USE DISORDER: ICD-10-CM

## 2017-03-29 DIAGNOSIS — K63.2 ENTEROCUTANEOUS FISTULA: ICD-10-CM

## 2017-03-29 DIAGNOSIS — J45.30 MILD PERSISTENT ASTHMA WITHOUT COMPLICATION: ICD-10-CM

## 2017-03-29 DIAGNOSIS — F51.01 PRIMARY INSOMNIA: Primary | ICD-10-CM

## 2017-03-29 DIAGNOSIS — Z01.818 PREOP GENERAL PHYSICAL EXAM: Primary | ICD-10-CM

## 2017-03-29 DIAGNOSIS — I10 HYPERTENSION GOAL BP (BLOOD PRESSURE) < 140/90: ICD-10-CM

## 2017-03-29 DIAGNOSIS — F51.01 PRIMARY INSOMNIA: ICD-10-CM

## 2017-03-29 PROCEDURE — 99214 OFFICE O/P EST MOD 30 MIN: CPT | Performed by: FAMILY MEDICINE

## 2017-03-29 RX ORDER — ESZOPICLONE 2 MG/1
2 TABLET, FILM COATED ORAL
Qty: 30 TABLET | Refills: 1 | Status: SHIPPED | OUTPATIENT
Start: 2017-03-29 | End: 2017-03-30

## 2017-03-29 ASSESSMENT — PAIN SCALES - GENERAL: PAINLEVEL: NO PAIN (0)

## 2017-03-29 NOTE — PROGRESS NOTES
[address] [address]  [phone]  Dept: [phone]    PRE-OP EVALUATION:  Today's date: 3/29/2017    Jennifer Gonsalez (: 1975) presents for pre-operative evaluation assessment as requested by Dr. Castillo and Johny.  She requires evaluation and anesthesia risk assessment prior to undergoing surgery/procedure for treatment of Colocutaneous fistula  .  Proposed procedure: fistulectomy    Date of Surgery/ Procedure: 17  Time of Surgery/ Procedure: 7:30am  Hospital/Surgical Facility: [facility]  Fax number for surgical facility: available electronically  Primary Physician: Myla Davies  Type of Anesthesia Anticipated: General    Patient has a Health Care Directive or Living Will:  YES     1. NO - Do you have a history of heart attack, stroke, stent, bypass or surgery on an artery in the head, neck, heart or legs?  2. NO - Do you ever have any pain or discomfort in your chest?  3. NO - Do you have a history of  Heart Failure?  4. NO - Are you troubled by shortness of breath when: walking on the level, up a slight hill or at night?  5. NO - Do you currently have a cold, bronchitis or other respiratory infection?  6. NO - Do you have a cough, shortness of breath or wheezing?  7. NO - Do you sometimes get pains in the calves of your legs when you walk?  8. NO - Do you or anyone in your family have previous history of blood clots?  9. NO - Do you or does anyone in your family have a serious bleeding problem such as prolonged bleeding following surgeries or cuts?  10. YES - Have you ever had problems with anemia or been told to take iron pills? Was on iron in the past  11. NO - Have you had any abnormal blood loss such as black, tarry or bloody stools, or abnormal vaginal bleeding?  12. YES - Have you ever had a blood transfusion? Related PID and surgery  13. NO - Have you or any of your relatives ever had problems with  anesthesia?  14. YES - Do you have sleep apnea, excessive snoring or daytime drowsiness? Trouble sleeping due to insomnia  15. NO - Do you have any prosthetic heart valves?  16. NO - Do you have prosthetic joints?  17. NO - Is there any chance that you may be pregnant?      HPI:                                                      Brief HPI related to upcoming procedure: chronic drainage from right abdominal wound.  CT showed fistula involving bowel.  Surgical intervention recommended      See problem list for active medical problems.  Problems all longstanding and stable, except as noted/documented.  See ROS for pertinent symptoms related to these conditions.                                                                                                  .  HYPERTENSION - Patient has longstanding history of mod-severe HTN , currently denies any symptoms referable to elevated blood pressure. Specifically denies chest pain, palpitations, dyspnea, orthopnea, PND or peripheral edema. Blood pressure readings have been in normal range. Current medication regimen is as listed below. Patient denies any side effects of medication.                                                                                                                                                                                          .    MEDICAL HISTORY:                                                      Patient Active Problem List    Diagnosis Date Noted     Primary insomnia 08/24/2016     Priority: Medium     Other iron deficiency anemia 08/24/2016     Priority: Medium     Hormone replacement therapy (HRT) 06/29/2016     Priority: Medium     Empyema (H) 05/25/2016     Priority: Medium     Obesity with body mass index 30 or greater 05/22/2016     Priority: Medium     Submental mass 12/31/2015     Priority: Medium     Non morbid obesity due to excess calories 12/07/2015     Priority: Medium     Tobacco use disorder      Priority: Medium      Acute reaction to stress 09/29/2014     Priority: Medium     Seasonal allergic rhinitis 08/14/2013     Priority: Medium     Hypertension goal BP (blood pressure) < 140/90 09/19/2012     Priority: Medium     GERD (gastroesophageal reflux disease) 06/25/2012     Priority: Medium     CARDIOVASCULAR SCREENING; LDL GOAL LESS THAN 160 10/31/2010     Priority: Medium     Mild persistent asthma 07/07/2010     Priority: Medium      Past Medical History:   Diagnosis Date     Chlamydia 2014     Dysmenorrhea      H/O transfusion of packed red blood cells 2016     HTN (hypertension), benign 2014     Leiomyoma of uterus 2013     Mild intermittent asthma      Obesity      TOA (tubo-ovarian abscess) 2016     Tobacco use disorder      Past Surgical History:   Procedure Laterality Date     C EXCIS UTERINE FIBROID,ABD APPRCH  2014     CHOLECYSTECTOMY, LAPOROSCOPIC  2014    Cholecystectomy, Laparoscopic     HERNIA REPAIR, UMBILICAL  2014     MAMMOPLASTY REDUCTION  2000    [place]     SALPINGO-OOPHORECTOMY BILATERAL  2016    Open BSO for bilateral TOA     THORACENTESIS  2016    inflammatory pleural infusion     Current Outpatient Prescriptions   Medication Sig Dispense Refill     order for DME Equipment being ordered: Blood pressure monitor. 1 Device 0     albuterol (PROAIR HFA, PROVENTIL HFA, VENTOLIN HFA) 108 (90 BASE) MCG/ACT inhaler Inhale 2 puffs into the lungs every 6 hours as needed for shortness of breath / dyspnea 1 Inhaler 5     albuterol (2.5 MG/3ML) 0.083% nebulizer solution Take 1 vial (2.5 mg) by nebulization every 6 hours as needed for shortness of breath / dyspnea 60 vial 6     chlorhexidine (HIBICLENS) 4 % liquid Apply topically daily as needed for wound care (Patient not taking: Reported on 3/13/2017) 480 mL 1     lisinopril-hydrochlorothiazide (PRINZIDE/ZESTORETIC) 10-12.5 MG per tablet Take 1 tablet by mouth every morning (Patient not taking: Reported on 3/13/2017) 90 tablet 1     OTC products: None, except as noted  "above    Allergies   Allergen Reactions     Morphine Anxiety     Lactase Diarrhea     Diclofenac Rash      Latex Allergy: NO    Social History   Substance Use Topics     Smoking status: Current Some Day Smoker     Packs/day: 0.20     Types: Cigarettes     Smokeless tobacco: Never Used      Comment: 1 cig per day     Alcohol use 0.0 oz/week     0 Standard drinks or equivalent per week      Comment: not often     History   Drug Use No       REVIEW OF SYSTEMS:                                                    Constitutional, neuro, ENT, endocrine, pulmonary, cardiac, gastrointestinal, genitourinary, musculoskeletal, integument and psychiatric systems are negative, except as otherwise noted.    EXAM:                                                    BP (!) 140/92  Pulse 74  Temp 97  F (36.1  C) (Oral)  Resp 17  Ht 5' 4\" (1.626 m)  Wt 189 lb (85.7 kg)  LMP 09/09/2016  SpO2 100%  Breastfeeding? No  BMI 32.44 kg/m2    GENERAL APPEARANCE: healthy, alert and no distress     EYES: EOMI, PERRL     HENT: ear canals and TM's normal and nose and mouth without ulcers or lesions     NECK: no adenopathy, no asymmetry, masses, or scars and thyroid normal to palpation     RESP: lungs clear to auscultation - no rales, rhonchi or wheezes     CV: regular rates and rhythm, normal S1 S2, no S3 or S4 and no murmur, click or rub     ABDOMEN:  soft, nontender, no HSM or masses and bowel sounds normal     MS: extremities normal- no gross deformities noted, no evidence of inflammation in joints, FROM in all extremities.     NEURO: Normal strength and tone, sensory exam grossly normal, mentation intact and speech normal     PSYCH: mentation appears normal. and affect normal/bright     LYMPHATICS: No axillary, cervical, or supraclavicular nodes    DIAGNOSTICS:                                                    See BMP below    Recent Labs   Lab Test  03/13/17   1915  08/24/16   1347  06/13/16   1653  05/23/16   HGB   --   12.2  8.7*   " --    --    PLT   --   231  473*   --    --    INR   --    --    --    --   1.3   NA  141  139   --    --    --    POTASSIUM  3.7  3.4   --    < >   --    CR  0.76  0.83   --    < >   --     < > = values in this interval not displayed.        IMPRESSION:                                                    Reason for surgery/procedure: abdominal fistula  Diagnosis/reason for consult: The primary encounter diagnosis was Preop general physical exam. Diagnoses of Enterocutaneous fistula, Primary insomnia, Mild persistent asthma without complication, Hypertension goal BP (blood pressure) < 140/90, and Tobacco use disorder were also pertinent to this visit.     The proposed surgical procedure is considered INTERMEDIATE risk.    REVISED CARDIAC RISK INDEX  The patient has the following serious cardiovascular risks for perioperative complications such as (MI, PE, VFib and 3  AV Block):  No serious cardiac risks  INTERPRETATION: 0 risks: Class I (very low risk - 0.4% complication rate)    The patient has the following additional risks for perioperative complications:  No identified additional risks    No diagnosis found.    RECOMMENDATIONS:                                                      --Patient is to take all scheduled medications on the day of surgery EXCEPT for modifications listed below.    ACE Inhibitor or Angiotensin Receptor Blocker (ARB) Use  Ace inhibitor or Angiotensin Receptor Blocker (ARB) and should HOLD this medication for the 24 hours prior to surgery.      APPROVAL GIVEN to proceed with proposed procedure, without further diagnostic evaluation       Signed Electronically by: Myla Obrien MD    Copy of this evaluation report is provided to requesting physician.    [facility] Preop Guidelines

## 2017-03-29 NOTE — PATIENT INSTRUCTIONS
How to contact your care team: [phone] Pharmacy [phone]       Clinic hours M-Th 7am-7pm Fri 7am-5pm.   Urgent care M-F 11am-9pm  Sat/Sun 9am-5pm.   Pharmacy   Mon-Th:  8:00am-8pm   Fri:  8:00am-6:00pm  Sat/Sun  8:00am-5:00 pm       Before Your Surgery      Call your surgeon if there is any change in your health. This includes signs of a cold or flu (such as a sore throat, runny nose, cough, rash or fever).    Do not smoke, drink alcohol or take over the counter medicine (unless your surgeon or primary care doctor tells you to) for the 24 hours before and after surgery.    If you take prescribed drugs: Follow your doctor s orders about which medicines to take and which to stop until after surgery.    Eating and drinking prior to surgery: follow the instructions from your surgeon    Take a shower or bath the night before surgery. Use the soap your surgeon gave you to gently clean your skin. If you do not have soap from your surgeon, use your regular soap. Do not shave or scrub the surgery site.  Wear clean pajamas and have clean sheets on your bed.     Hold lisinopril-HCTZ the day of surgery.

## 2017-03-29 NOTE — TELEPHONE ENCOUNTER
[employer] requires prior authorization for Eszopiclone 2mg (lunesta)  Insurance ID [id number]  Insurance phone [phone]    On behalf of [facility]  Carmela L[facility]

## 2017-03-29 NOTE — NURSING NOTE
"Chief Complaint   Patient presents with     Pre-Op Exam       Initial BP (!) 140/92  Pulse 74  Temp 97  F (36.1  C) (Oral)  Resp 17  Ht 5' 4\" (1.626 m)  Wt 189 lb (85.7 kg)  LMP 09/09/2016  SpO2 100%  Breastfeeding? No  BMI 32.44 kg/m2 Estimated body mass index is 32.44 kg/(m^2) as calculated from the following:    Height as of this encounter: 5' 4\" (1.626 m).    Weight as of this encounter: 189 lb (85.7 kg).  Medication Reconciliation: complete     Elaina Oconnell MA       "

## 2017-03-29 NOTE — MR AVS SNAPSHOT
After Visit Summary   3/29/2017    Jennifer Gonsalez    MRN: [id number]           Patient Information     Date Of Birth          1975        Visit Information        Provider Department      3/29/2017 4:40 PM Myla Davies MD [facility]        Today's Diagnoses     Preop general physical exam    -  1    Enterocutaneous fistula        Primary insomnia        Mild persistent asthma without complication        Hypertension goal BP (blood pressure) < 140/90        Tobacco use disorder          Care Instructions    How to contact your care team: [phone] Pharmacy [phone]       Clinic hours M-Th 7am-7pm Fri 7am-5pm.   Urgent care M-F 11am-9pm  Sat/Sun 9am-5pm.   Pharmacy   Mon-Th:  8:00am-8pm   Fri:  8:00am-6:00pm  Sat/Sun  8:00am-5:00 pm       Before Your Surgery      Call your surgeon if there is any change in your health. This includes signs of a cold or flu (such as a sore throat, runny nose, cough, rash or fever).    Do not smoke, drink alcohol or take over the counter medicine (unless your surgeon or primary care doctor tells you to) for the 24 hours before and after surgery.    If you take prescribed drugs: Follow your doctor s orders about which medicines to take and which to stop until after surgery.    Eating and drinking prior to surgery: follow the instructions from your surgeon    Take a shower or bath the night before surgery. Use the soap your surgeon gave you to gently clean your skin. If you do not have soap from your surgeon, use your regular soap. Do not shave or scrub the surgery site.  Wear clean pajamas and have clean sheets on your bed.     Hold lisinopril-HCTZ the day of surgery.        Follow-ups after your visit        Your next 10 appointments already scheduled     Apr 20, 2017  8:00 AM CDT   Post Op with Rakan Mcclain DO   [facility]    [address] [place] [address]  "[place] [id number] [phone]              Who to contact     If you have questions or need follow up information about today's clinic visit or your schedule please contact [facility] directly at [phone].  Normal or non-critical lab and imaging results will be communicated to you by MyChart, letter or phone within 4 business days after the clinic has received the results. If you do not hear from us within 7 days, please contact the clinic through MyChart or phone. If you have a critical or abnormal lab result, we will notify you by phone as soon as possible.  Submit refill requests through [employer] or call your pharmacy and they will forward the refill request to us. Please allow 3 business days for your refill to be completed.          Additional Information About Your Visit        [employer] Information     [employer] lets you send messages to your doctor, view your test results, renew your prescriptions, schedule appointments and more. To sign up, go to [email or web address] . Click on \"Log in\" on the left side of the screen, which will take you to the Welcome page. Then click on \"Sign up Now\" on the right side of the page.     You will be asked to enter the access code listed below, as well as some personal information. Please follow the directions to create your username and password.     Your access code is: [email or web address]  Expires: 2017  7:11 PM     Your access code will  in 90 days. If you need help or a new code, please call your [facility] or [phone].        Care EveryWhere ID     This is your Care EveryWhere ID. This could be used by other organizations to access your [place] medical records  [id number]        Your Vitals Were     Pulse Temperature Respirations Height Last Period Pulse Oximetry    74 97  F (36.1  C) (Oral) 17 5' 4\" (1.626 m) 2016 100%    Breastfeeding? BMI (Body Mass Index)                No 32.44 kg/m2           Blood Pressure from Last " 3 Encounters:   03/29/17 138/86   03/23/17 (!) 158/106   03/16/17 (!) 163/97    Weight from Last 3 Encounters:   03/29/17 189 lb (85.7 kg)   03/23/17 190 lb (86.2 kg)   03/16/17 187 lb (84.8 kg)              Today, you had the following     No orders found for display         Where to get your medicines      Some of these will need a paper prescription and others can be bought over the counter.  Ask your nurse if you have questions.     Bring a paper prescription for each of these medications     eszopiclone 2 MG tablet          Primary Care Provider Office Phone # Fax #    Myla Shawna Obrien [phone]       [facility] [address]  [facility] [address]        Thank you!     Thank you for choosing [facility]  for your care. Our goal is always to provide you with excellent care. Hearing back from our patients is one way we can continue to improve our services. Please take a few minutes to complete the written survey that you may receive in the mail after your visit with us. Thank you!             Your Updated Medication List - Protect others around you: Learn how to safely use, store and throw away your medicines at www.disposemymeds.org.          This list is accurate as of: 3/29/17  5:07 PM.  Always use your most recent med list.                   Brand Name Dispense Instructions for use    * albuterol 108 (90 BASE) MCG/ACT Inhaler    PROAIR HFA/PROVENTIL HFA/VENTOLIN HFA    1 Inhaler    Inhale 2 puffs into the lungs every 6 hours as needed for shortness of breath / dyspnea       * albuterol (2.5 MG/3ML) 0.083% neb solution     60 vial    Take 1 vial (2.5 mg) by nebulization every 6 hours as needed for shortness of breath / dyspnea       chlorhexidine 4 % liquid    HIBICLENS    480 mL    Apply topically daily as needed for wound care       eszopiclone 2 MG tablet    LUNESTA    30 tablet    Take 1 tablet (2 mg) by mouth nightly as needed for  sleep       lisinopril-hydrochlorothiazide 10-12.5 MG per tablet    PRINZIDE/ZESTORETIC    90 tablet    Take 1 tablet by mouth every morning       order for DME     1 Device    Equipment being ordered: Blood pressure monitor.       * Notice:  This list has 2 medication(s) that are the same as other medications prescribed for you. Read the directions carefully, and ask your doctor or other care provider to review them with you.

## 2017-04-04 ENCOUNTER — TELEPHONE (OUTPATIENT)
Dept: SURGERY | Facility: CLINIC | Age: 42
End: 2017-04-04

## 2017-04-04 NOTE — TELEPHONE ENCOUNTER
Reason for Call:  Other call back    Detailed comments: patient states that she scheduled a colonoscopy for this Friday at 7:30AM at [facility]. Patient would like to know if she needs to prep for this procedure. Please contact patient    Phone Number Patient can be reached at: Home number on file [phone] (home)    Best Time: any time    Can we leave a detailed message on this number? YES    Call taken on 4/4/2017 at 4:18 PM by Nataly Waddell

## 2017-04-07 ENCOUNTER — TRANSFERRED RECORDS (OUTPATIENT)
Dept: HEALTH INFORMATION MANAGEMENT | Facility: CLINIC | Age: 42
End: 2017-04-07

## 2017-04-18 ENCOUNTER — OFFICE VISIT (OUTPATIENT)
Dept: SURGERY | Facility: CLINIC | Age: 42
End: 2017-04-18
Payer: COMMERCIAL

## 2017-04-18 VITALS
HEART RATE: 79 BPM | SYSTOLIC BLOOD PRESSURE: 140 MMHG | BODY MASS INDEX: 31.76 KG/M2 | DIASTOLIC BLOOD PRESSURE: 97 MMHG | TEMPERATURE: 97.9 F | OXYGEN SATURATION: 98 % | WEIGHT: 185 LBS

## 2017-04-18 DIAGNOSIS — K63.2 COLOCUTANEOUS FISTULA: Primary | ICD-10-CM

## 2017-04-18 PROCEDURE — 99024 POSTOP FOLLOW-UP VISIT: CPT | Performed by: SURGERY

## 2017-04-18 ASSESSMENT — PAIN SCALES - GENERAL: PAINLEVEL: NO PAIN (0)

## 2017-04-18 NOTE — PATIENT INSTRUCTIONS
General Surgery - [place] Location.    If you have any questions regarding to your visit please contact your care team:     Jolene Bland [facility] [facility] [place] [address]   Dr. Carlos Eduardo [place], [place].  Birdie Bland.  Birdie Bland.   Rhianna Salas [facility]  Guilherme Diaz [facility]  Lynda Be [facility]   Appointment line: [phone]  [facility] Desk: [phone]  Specialty Scheduling (for surgeries only) [phone]     If you need a medication refill please contact your pharmacy.  Please allow 3 business days for your refill to be completed.    As always, Thank you for trusting us with your health care needs!  _____________________________________________________________________

## 2017-04-18 NOTE — Clinical Note
I had the pleasure of seeing Jennifer Gonsalez in the clinic for follow up following her surgery. She had a colocutaneous fistula and is doing well following her surgery.   Thanks for allowing me the chance to participate in her care.  Sincerely,  Rakan Mcclain  General Surgery  Head and Neck/Endocrine Surgery

## 2017-04-18 NOTE — LETTER
[address] [place] [address]  Phone: [phone]    April 18, 2017        Jennifer Gonsalez  [address] [facility] [address]          To whom it may concern:    RE: Jennifer Gonsalez    Patient may return to work 5/7/17 with the following:  Light duty-unable to lift more than 20 pounds    Please contact me for questions or concerns.      Sincerely,        Rakan Mcclain, DO

## 2017-04-18 NOTE — NURSING NOTE
"Chief Complaint   Patient presents with     Surgical Followup     2 week post op, feeling well just ready to get the staples out. No fevers.       Initial BP (!) 140/97 (BP Location: Right arm, Patient Position: Chair, Cuff Size: Adult Regular)  Pulse 79  Temp 97.9  F (36.6  C) (Oral)  Wt 185 lb (83.9 kg)  LMP 09/09/2016  SpO2 98%  BMI 31.76 kg/m2 Estimated body mass index is 31.76 kg/(m^2) as calculated from the following:    Height as of 3/29/17: 5' 4\" (1.626 m).    Weight as of this encounter: 185 lb (83.9 kg)..  BP completed using cuff size: regular      Charu Be CMA      "

## 2017-04-18 NOTE — MR AVS SNAPSHOT
After Visit Summary   4/18/2017    Jennifer Gonsalez    MRN: [id number]           Patient Information     Date Of Birth          1975        Visit Information        Provider Department      4/18/2017 9:30 AM Rakan Mcclain, DO [facility]        Today's Diagnoses     Colocutaneous fistula    -  1      Care Instructions           General Surgery - [place] Location.    If you have any questions regarding to your visit please contact your care team:     [facility] [address] [facility] [place] [address]   [address]over.  Birdie [place].  Birdie Bland.   Rhianna Salas [facility]  Guilherme Diaz [facility]  Lynda Be [facility]   Appointment line: [phone]  [facility] Desk: [phone]  Specialty Scheduling (for surgeries only) [phone]     If you need a medication refill please contact your pharmacy.  Please allow 3 business days for your refill to be completed.    As always, Thank you for trusting us with your health care needs!  _____________________________________________________________________              Follow-ups after your visit        Who to contact     If you have questions or need follow up information about today's clinic visit or your schedule please contact [facility] directly at [phone].  Normal or non-critical lab and imaging results will be communicated to you by MyChart, letter or phone within 4 business days after the clinic has received the results. If you do not hear from us within 7 days, please contact the clinic through MyChart or phone. If you have a critical or abnormal lab result, we will notify you by phone as soon as possible.  Submit refill requests through [employer] or call your pharmacy and they will forward the refill request to us. Please allow 3 business days for your refill to be completed.          Additional Information About Your Visit        MyChart  "Information     [employer] lets you send messages to your doctor, view your test results, renew your prescriptions, schedule appointments and more. To sign up, go to [email or web address] . Click on \"Log in\" on the left side of the screen, which will take you to the Welcome page. Then click on \"Sign up Now\" on the right side of the page.     You will be asked to enter the access code listed below, as well as some personal information. Please follow the directions to create your username and password.     Your access code is: [email or web address]  Expires: 2017  7:11 PM     Your access code will  in 90 days. If you need help or a new code, please call your [facility] clinic or [phone].        Care EveryWhere ID     This is your Care EveryWhere ID. This could be used by other organizations to access your [facility] medical records  [id number]        Your Vitals Were     Pulse Temperature Last Period Pulse Oximetry BMI (Body Mass Index)       79 97.9  F (36.6  C) (Oral) 2016 98% 31.76 kg/m2        Blood Pressure from Last 3 Encounters:   17 (!) 140/97   17 138/86   17 (!) 158/106    Weight from Last 3 Encounters:   17 83.9 kg (185 lb)   17 85.7 kg (189 lb)   17 86.2 kg (190 lb)              Today, you had the following     No orders found for display       Primary Care Provider Office Phone # Fax #    Mylaroberth Obrien [phone]       [facility] [place] [address]  [facility] [address]        Thank you!     Thank you for choosing [facility]  for your care. Our goal is always to provide you with excellent care. Hearing back from our patients is one way we can continue to improve our services. Please take a few minutes to complete the written survey that you may receive in the mail after your visit with us. Thank you!             Your Updated Medication List - Protect others around you: Learn how to safely use, " store and throw away your medicines at www.disposemymeds.org.          This list is accurate as of: 4/18/17 11:59 PM.  Always use your most recent med list.                   Brand Name Dispense Instructions for use    * albuterol 108 (90 BASE) MCG/ACT Inhaler    PROAIR HFA/PROVENTIL HFA/VENTOLIN HFA    1 Inhaler    Inhale 2 puffs into the lungs every 6 hours as needed for shortness of breath / dyspnea       * albuterol (2.5 MG/3ML) 0.083% neb solution     60 vial    Take 1 vial (2.5 mg) by nebulization every 6 hours as needed for shortness of breath / dyspnea       chlorhexidine 4 % liquid    HIBICLENS    480 mL    Apply topically daily as needed for wound care       doxylamine 25 MG Tabs tablet    UNISOM    30 each    Take 1 tablet (25 mg) by mouth At Bedtime       lisinopril-hydrochlorothiazide 10-12.5 MG per tablet    PRINZIDE/ZESTORETIC    90 tablet    Take 1 tablet by mouth every morning       order for DME     1 Device    Equipment being ordered: Blood pressure monitor.       * Notice:  This list has 2 medication(s) that are the same as other medications prescribed for you. Read the directions carefully, and ask your doctor or other care provider to review them with you.

## 2017-04-18 NOTE — PROGRESS NOTES
General Surgery Post Op    Pt returns for follow up visit s/p excision of fistula tract and sigmoid resection on 4/7/17.    Patient has been doing well, tolerating diet. Bowels moving well. Pain controlled. No issues with wound healing/redness/drainage. No fevers or chills.     Physical exam: Vitals: BP (!) 140/97 (BP Location: Right arm, Patient Position: Chair, Cuff Size: Adult Regular)  Pulse 79  Temp 97.9  F (36.6  C) (Oral)  Wt 83.9 kg (185 lb)  LMP 09/09/2016  SpO2 98%  BMI 31.76 kg/m2  BMI= Body mass index is 31.76 kg/(m^2).    Exam:  Constitutional: healthy, alert and no distress  Respiratory: Non-labored  Gastrointestinal: Abdomen soft, non-tender. BS normal. No masses, organomegaly  Midline incision is healing well with no erythema or exudate, staples removed.   RLQ incisions is healing well with no erythema or exudate minimal packing is in place. Packing removed and dressing placed    Path:  Colon, sigmoid and colocutaneous fistula tract, resection - Negative for malignancy.  Colonic tissue and skin with foreign body granulomatous inflammation, areas of necrosis, and features of diverticular disease (compatible with reported history of colocutaneous fistula tract).    Assessment: Pt is doing well s/p excision of fistula tract and sigmoid resection  - We discussed the pathology results and all questions were answered to the best of my ability.     Plan: Pt doing well and can follow up as needed.       Rakan Mcclain, DO

## 2017-04-26 ENCOUNTER — TELEPHONE (OUTPATIENT)
Dept: SURGERY | Facility: CLINIC | Age: 42
End: 2017-04-26

## 2017-04-27 ENCOUNTER — TELEPHONE (OUTPATIENT)
Dept: SURGERY | Facility: CLINIC | Age: 42
End: 2017-04-27

## 2017-05-09 ENCOUNTER — TELEPHONE (OUTPATIENT)
Dept: SURGERY | Facility: CLINIC | Age: 42
End: 2017-05-09

## 2017-05-09 NOTE — TELEPHONE ENCOUNTER
Reason for Call:  Other prescription    Detailed comments: Patient reports that the medical paperwork were incorrectly filled out. Patient request a call back to make the necessary corrections.    Phone Number Patient can be reached at: Home number on file [phone] (home)    Best Time: ASAP    Can we leave a detailed message on this number? YES    Call taken on 5/9/2017 at 2:21 PM by Macarena Dougherty

## 2017-05-10 NOTE — TELEPHONE ENCOUNTER
Reason for Call:  Other call back    Detailed comments: Patient states the return to work date is wrong on the medical paperwork April 7th 2016 instead of May 7th 2017. Please call patient .     Phone Number Patient can be reached at: Home number on file [phone] (home)    Best Time: any    Can we leave a detailed message on this number? YES    Call taken on 5/10/2017 at 8:42 AM by Gela Pollard

## 2017-05-11 ENCOUNTER — TELEPHONE (OUTPATIENT)
Dept: SURGERY | Facility: CLINIC | Age: 42
End: 2017-05-11

## 2017-05-11 NOTE — TELEPHONE ENCOUNTER
Patient would like to speak with 's team regarding the medical paperwork . Please call above number.

## 2017-06-15 ENCOUNTER — TELEPHONE (OUTPATIENT)
Dept: SURGERY | Facility: CLINIC | Age: 42
End: 2017-06-15

## 2017-06-15 NOTE — TELEPHONE ENCOUNTER
Reason for Call:  Other call back    Detailed comments:  Patient calling on forms she needs for her job. Please call her.     Phone Number Patient can be reached at: Home number on file [phone] (home)    Best Time:  Any     Can we leave a detailed message on this number? YES    Call taken on 6/15/2017 at 3:39 PM by Debora Nunez

## 2017-06-15 NOTE — LETTER
[address] [place]  [place] [address]  Phone: [phone]    June 20, 2017        Jennifer Gonsalez  [address]    [facility] [address]          To whom it may concern:    RE: Jennifer Gonsalez    Patient had a Lap Sigmoid Resection with a  Fistulotomy performed on 4/7/2017. Jennifer was inpatient for 5 days then was discharged to home for recovery until 5/8/2017.     Please contact me for questions or concerns.      Sincerely,        Rakan Mcclain, DO

## 2017-06-19 NOTE — TELEPHONE ENCOUNTER
Patient is returning call regarding previous message and would like Rhianna to call her back. Thank you.

## 2017-07-17 ENCOUNTER — OFFICE VISIT (OUTPATIENT)
Dept: FAMILY MEDICINE | Facility: CLINIC | Age: 42
End: 2017-07-17

## 2017-07-17 VITALS
DIASTOLIC BLOOD PRESSURE: 84 MMHG | TEMPERATURE: 97 F | OXYGEN SATURATION: 100 % | HEIGHT: 64 IN | HEART RATE: 80 BPM | SYSTOLIC BLOOD PRESSURE: 130 MMHG | BODY MASS INDEX: 32.78 KG/M2 | WEIGHT: 192 LBS

## 2017-07-17 DIAGNOSIS — I10 ESSENTIAL HYPERTENSION WITH GOAL BLOOD PRESSURE LESS THAN 140/90: Primary | ICD-10-CM

## 2017-07-17 DIAGNOSIS — Z11.3 SCREEN FOR STD (SEXUALLY TRANSMITTED DISEASE): ICD-10-CM

## 2017-07-17 DIAGNOSIS — J45.31 MILD PERSISTENT ASTHMA WITH ACUTE EXACERBATION: ICD-10-CM

## 2017-07-17 DIAGNOSIS — F51.01 PRIMARY INSOMNIA: ICD-10-CM

## 2017-07-17 PROCEDURE — 87591 N.GONORRHOEAE DNA AMP PROB: CPT | Performed by: FAMILY MEDICINE

## 2017-07-17 PROCEDURE — 36415 COLL VENOUS BLD VENIPUNCTURE: CPT | Performed by: FAMILY MEDICINE

## 2017-07-17 PROCEDURE — 99214 OFFICE O/P EST MOD 30 MIN: CPT | Performed by: FAMILY MEDICINE

## 2017-07-17 PROCEDURE — 87491 CHLMYD TRACH DNA AMP PROBE: CPT | Performed by: FAMILY MEDICINE

## 2017-07-17 PROCEDURE — 86803 HEPATITIS C AB TEST: CPT | Performed by: FAMILY MEDICINE

## 2017-07-17 PROCEDURE — 87340 HEPATITIS B SURFACE AG IA: CPT | Performed by: FAMILY MEDICINE

## 2017-07-17 PROCEDURE — 87389 HIV-1 AG W/HIV-1&-2 AB AG IA: CPT | Performed by: FAMILY MEDICINE

## 2017-07-17 PROCEDURE — 86780 TREPONEMA PALLIDUM: CPT | Performed by: FAMILY MEDICINE

## 2017-07-17 RX ORDER — ALBUTEROL SULFATE 90 UG/1
2 AEROSOL, METERED RESPIRATORY (INHALATION) EVERY 6 HOURS PRN
Qty: 1 INHALER | Refills: 5 | Status: SHIPPED | OUTPATIENT
Start: 2017-07-17 | End: 2019-05-22

## 2017-07-17 RX ORDER — LISINOPRIL/HYDROCHLOROTHIAZIDE 10-12.5 MG
1 TABLET ORAL EVERY MORNING
Qty: 90 TABLET | Refills: 1 | Status: SHIPPED | OUTPATIENT
Start: 2017-07-17 | End: 2019-05-22

## 2017-07-17 NOTE — NURSING NOTE
"Chief Complaint   Patient presents with     Hypertension       Initial /90 (BP Location: Right arm, Patient Position: Sitting, Cuff Size: Adult Regular)  Pulse 80  Temp 97  F (36.1  C) (Oral)  Ht 5' 4\" (1.626 m)  Wt 192 lb (87.1 kg)  LMP 09/09/2016  SpO2 100%  BMI 32.96 kg/m2 Estimated body mass index is 32.96 kg/(m^2) as calculated from the following:    Height as of this encounter: 5' 4\" (1.626 m).    Weight as of this encounter: 192 lb (87.1 kg).  Medication Reconciliation: complete   Hilary Crain MA      "

## 2017-07-17 NOTE — PATIENT INSTRUCTIONS
Based on your medical history and these are the current health maintenance or preventive care services that you are due for (some may have been done at this visit)  There are no preventive care reminders to display for this patient.      At [facility], we strive to deliver an exceptional experience to you, every time we see you.    If you receive a survey in the mail, please send us back your thoughts. We really do value your feedback.    Your care team's suggested websites for health information:  Www.[employer].org : Up to date and easily searchable information on multiple topics.  Www.medlineplus.gov : medication info, interactive tutorials, watch real surgeries online  Www.familydoctor.org : good info from the Academy of Family Physicians  Www.cdc.gov : public health info, travel advisories, epidemics (H1N1)  Www.aap.org : children's health info, normal development, vaccinations  Www.health.[facility].[place].us : [place] dept of health, public health issues in [place], N1N1    How to contact your care team:   Team Danielle/[facility] [phone]         Pharmacy [phone]    Dr. Mujica, Ellen Martinez PA-C, Dr. Steinberg, Erin SALAS CNP, Mihaela Hobson PA-C, Dr. Rader, and JOSUE Zamarripa CNP    Team RNs: Morena Ch      Clinic hours  M-Th 7 am-7 pm   Fri 7 am-5 pm.   Urgent care M-F 11 am-9 pm,   Sat/Sun 9 am-5 pm.  Pharmacy M-Th 8 am-8 pm Fri 8 am-6 pm  Sat/Sun 9 am-5 pm.     All password changes, disabled accounts, or ID changes in [employer]/MyHealth will be done by our Access Services Department.    If you need help with your account or password, call: [phone]. Clinic staff no longer has the ability to change passwords.

## 2017-07-17 NOTE — MR AVS SNAPSHOT
After Visit Summary   7/17/2017    Jennifer Gonsalez    MRN: [id number]           Patient Information     Date Of Birth          1975        Visit Information        Provider Department      7/17/2017 6:40 PM Myla Davies MD [facility]        Today's Diagnoses     Essential hypertension with goal blood pressure less than 140/90    -  1    Mild persistent asthma with acute exacerbation        Screen for STD (sexually transmitted disease)        Primary insomnia          Care Instructions    Based on your medical history and these are the current health maintenance or preventive care services that you are due for (some may have been done at this visit)  There are no preventive care reminders to display for this patient.      At [facility], we strive to deliver an exceptional experience to you, every time we see you.    If you receive a survey in the mail, please send us back your thoughts. We really do value your feedback.    Your care team's suggested websites for health information:  Www.[facility].org : Up to date and easily searchable information on multiple topics.  Www.medlineplus.gov : medication info, interactive tutorials, watch real surgeries online  Www.familydoctor.org : good info from the Academy of Family Physicians  Www.cdc.gov : public health info, travel advisories, epidemics (H1N1)  Www.aap.org : children's health info, normal development, vaccinations  Www.health.[facility].[place].us : [place] dept of health, public health issues in [place], N1N1    How to contact your care team:   Team Danielle/[facility]irit [phone]         Pharmacy [phone]    Dr. Mujica, Ellen Martinez PA-C, Erin Sellers APRN CNP, Mihaela Hobson PA-C, Dr. Rader, and JOSUE Zamarripa CNP    Team RNs: Morena & Jing      Clinic hours  M-Th 7 am-7 pm   Fri 7 am-5 pm.   Urgent care M-F 11 am-9 pm,   Sat/Sun 9 am-5 pm.  Pharmacy M-Th 8 am-8 pm Fri 8  "am-6 pm  Sat/Sun 9 am-5 pm.     All password changes, disabled accounts, or ID changes in [employer]/MyHealth will be done by our Access Services Department.    If you need help with your account or password, call: [phone]. Clinic staff no longer has the ability to change passwords.               Follow-ups after your visit        Who to contact     If you have questions or need follow up information about today's clinic visit or your schedule please contact [facility] directly at [phone].  Normal or non-critical lab and imaging results will be communicated to you by [employer], letter or phone within 4 business days after the clinic has received the results. If you do not hear from us within 7 days, please contact the clinic through [employer] or phone. If you have a critical or abnormal lab result, we will notify you by phone as soon as possible.  Submit refill requests through [employer] or call your pharmacy and they will forward the refill request to us. Please allow 3 business days for your refill to be completed.          Additional Information About Your Visit        [employer] Information     [employer] gives you secure access to your electronic health record. If you see a primary care provider, you can also send messages to your care team and make appointments. If you have questions, please call your primary care clinic.  If you do not have a primary care provider, please call [phone] and they will assist you.        Care EveryWhere ID     This is your Care EveryWhere ID. This could be used by other organizations to access your [facility] medical records  [id number]        Your Vitals Were     Pulse Temperature Height Last Period Pulse Oximetry BMI (Body Mass Index)    80 97  F (36.1  C) (Oral) 5' 4\" (1.626 m) 09/09/2016 100% 32.96 kg/m2       Blood Pressure from Last 3 Encounters:   07/17/17 130/84   04/18/17 (!) 140/97   03/29/17 138/86    Weight from Last 3 Encounters:   07/17/17 192 " lb (87.1 kg)   04/18/17 185 lb (83.9 kg)   03/29/17 189 lb (85.7 kg)              We Performed the Following     Anti Treponema     Chlamydia trachomatis PCR     Hepatitis B surface antigen     Hepatitis C antibody     HIV Antigen Antibody Combo     Neisseria gonorrhoeae PCR          Where to get your medicines      These medications were sent to [facility] - [place], [place] - [address]  [address], [facility] [address]     Phone:  [phone]     albuterol 108 (90 BASE) MCG/ACT Inhaler    doxylamine 25 MG Tabs tablet    lisinopril-hydrochlorothiazide 10-12.5 MG per tablet          Primary Care Provider Office Phone # Fax #    Myla Shawna Obrien [phone]       [facility] [address]  [facility] [address]        Equal Access to Services     JENNIE WEI : Hadii aad ku hadasho Soomaali, waaxda luqadaha, qaybta kaalmada adeegyada, waxay idiin hayaan adeeg kharash tamra . So [facility] [phone].    ATENCIÓN: Si habla español, tiene a lehman disposición servicios gratuitos de asistencia lingüística. Llame al [phone].    We comply with applicable federal civil rights laws and [place] laws. We do not discriminate on the basis of race, color, national origin, age, disability sex, sexual orientation or gender identity.            Thank you!     Thank you for choosing [facility]  for your care. Our goal is always to provide you with excellent care. Hearing back from our patients is one way we can continue to improve our services. Please take a few minutes to complete the written survey that you may receive in the mail after your visit with us. Thank you!             Your Updated Medication List - Protect others around you: Learn how to safely use, store and throw away your medicines at www.disposemymeds.org.          This list is accurate as of: 7/17/17  7:00 PM.  Always use your most recent med list.                    Brand Name Dispense Instructions for use Diagnosis    * albuterol (2.5 MG/3ML) 0.083% neb solution     60 vial    Take 1 vial (2.5 mg) by nebulization every 6 hours as needed for shortness of breath / dyspnea    Mild persistent asthma with acute exacerbation       * albuterol 108 (90 BASE) MCG/ACT Inhaler    PROAIR HFA/PROVENTIL HFA/VENTOLIN HFA    1 Inhaler    Inhale 2 puffs into the lungs every 6 hours as needed for shortness of breath / dyspnea    Mild persistent asthma with acute exacerbation       chlorhexidine 4 % liquid    HIBICLENS    480 mL    Apply topically daily as needed for wound care    Dehiscence of incision, initial encounter       doxylamine 25 MG Tabs tablet    UNISOM    90 each    Take 1 tablet (25 mg) by mouth At Bedtime    Primary insomnia       lisinopril-hydrochlorothiazide 10-12.5 MG per tablet    PRINZIDE/ZESTORETIC    90 tablet    Take 1 tablet by mouth every morning    Essential hypertension with goal blood pressure less than 140/90       order for DME     1 Device    Equipment being ordered: Blood pressure monitor.    Hypertension goal BP (blood pressure) < 140/90       * Notice:  This list has 2 medication(s) that are the same as other medications prescribed for you. Read the directions carefully, and ask your doctor or other care provider to review them with you.

## 2017-07-17 NOTE — PROGRESS NOTES
SUBJECTIVE:                                                    Jennifer Gonsalez is a 41 year old female who presents to clinic today for the following health issues:      Hypertension Follow-up      Outpatient blood pressures are not being checked.    Low Salt Diet: not monitoring salt      Amount of exercise or physical activity: 6-7 days/week for an average of less than 15 minutes    Problems taking medications regularly: No    Medication side effects: none    Diet: regular (no restrictions)    Asthma - doing well with current treatments.    Insomnia - stable with current treatment.    Requesting STD as has new sex partner.      Problem list and histories reviewed & adjusted, as indicated.  Additional history: as documented    Patient Active Problem List   Diagnosis     Mild persistent asthma     CARDIOVASCULAR SCREENING; LDL GOAL LESS THAN 160     GERD (gastroesophageal reflux disease)     Hypertension goal BP (blood pressure) < 140/90     Seasonal allergic rhinitis     Acute reaction to stress     Tobacco use disorder     Non morbid obesity due to excess calories     Submental mass     Empyema (H)     Hormone replacement therapy (HRT)     Primary insomnia     Other iron deficiency anemia     Obesity with body mass index 30 or greater     Past Surgical History:   Procedure Laterality Date     C EXCIS UTERINE FIBROID,ABD APPRCH  2014     CHOLECYSTECTOMY, LAPOROSCOPIC  2014    Cholecystectomy, Laparoscopic     GENERAL SURGERY                           DATE: 04/2017      Colocutaneous fistula.     HERNIA REPAIR, UMBILICAL  2014     MAMMOPLASTY REDUCTION  2000    [place]     SALPINGO-OOPHORECTOMY BILATERAL  2016    Open BSO for bilateral TOA     THORACENTESIS  2016    inflammatory pleural infusion       Social History   Substance Use Topics     Smoking status: Current Some Day Smoker     Packs/day: 0.20     Types: Cigarettes     Smokeless tobacco: Never Used      Comment: 1 cig per day     Alcohol use 0.0 oz/week     0  "Standard drinks or equivalent per week      Comment: not often     History reviewed. No pertinent family history.        Reviewed and updated as needed this visit by clinical staff       Reviewed and updated as needed this visit by Provider         ROS:  Constitutional, HEENT, cardiovascular, pulmonary, gi and gu systems are negative, except as otherwise noted.    OBJECTIVE:     /84  Pulse 80  Temp 97  F (36.1  C) (Oral)  Ht 5' 4\" (1.626 m)  Wt 192 lb (87.1 kg)  LMP 09/09/2016  SpO2 100%  BMI 32.96 kg/m2  Body mass index is 32.96 kg/(m^2).  GENERAL: healthy, alert and no distress  NECK: no adenopathy, no asymmetry, masses, or scars and thyroid normal to palpation  RESP: lungs clear to auscultation - no rales, rhonchi or wheezes  CV: regular rate and rhythm, normal S1 S2, no S3 or S4, no murmur, click or rub, no peripheral edema and peripheral pulses strong  ABDOMEN: soft, nontender, no hepatosplenomegaly, no masses and bowel sounds normal  MS: no gross musculoskeletal defects noted, no edema  PSYCH: mentation appears normal, affect normal/bright    Diagnostic Test Results:  none     ASSESSMENT/PLAN:     1. Mild persistent asthma with acute exacerbation  Stable - refilled  - albuterol (PROAIR HFA/PROVENTIL HFA/VENTOLIN HFA) 108 (90 BASE) MCG/ACT Inhaler; Inhale 2 puffs into the lungs every 6 hours as needed for shortness of breath / dyspnea  Dispense: 1 Inhaler; Refill: 5    2. Essential hypertension with goal blood pressure less than 140/90  Controlled - refilled and need for daily compliancy discussed  - lisinopril-hydrochlorothiazide (PRINZIDE/ZESTORETIC) 10-12.5 MG per tablet; Take 1 tablet by mouth every morning  Dispense: 90 tablet; Refill: 1    3. Screen for STD (sexually transmitted disease)  screening  - Chlamydia trachomatis PCR  - Neisseria gonorrhoeae PCR  - Hepatitis C antibody  - Hepatitis B surface antigen  - HIV Antigen Antibody Combo  - Anti Treponema    4. Primary insomnia  Stable - " refilled  - doxylamine (UNISOM) 25 MG TABS tablet; Take 1 tablet (25 mg) by mouth At Bedtime  Dispense: 90 each; Refill: 3    The uses and side effects, including black box warnings as appropriate, were discussed in detail.  All patient questions were answered.  The patient was instructed to call immediately if any side effects developed.     FUTURE APPOINTMENTS:       - Follow-up visit in Sept for labs    Myla Obrien MD  [facility]

## 2017-07-18 LAB
HBV SURFACE AG SERPL QL IA: NONREACTIVE
HCV AB SERPL QL IA: NORMAL
HIV 1+2 AB+HIV1 P24 AG SERPL QL IA: NORMAL
T PALLIDUM IGG+IGM SER QL: NEGATIVE

## 2017-07-19 LAB
C TRACH DNA SPEC QL NAA+PROBE: NORMAL
N GONORRHOEA DNA SPEC QL NAA+PROBE: NORMAL
SPECIMEN SOURCE: NORMAL
SPECIMEN SOURCE: NORMAL

## 2017-07-19 NOTE — PROGRESS NOTES
MsRusty Gonsalez,    Neither hepatitis B, hepatitis C, HIV, syphilis, gonorrhea nor chlamydia were found.     Please contact the clinic if you have additional questions.  Thank you.    Sincerely,    Myla Obrien

## 2017-12-14 ENCOUNTER — E-VISIT (OUTPATIENT)
Dept: FAMILY MEDICINE | Facility: CLINIC | Age: 42
End: 2017-12-14

## 2017-12-14 ENCOUNTER — TELEPHONE (OUTPATIENT)
Dept: FAMILY MEDICINE | Facility: CLINIC | Age: 42
End: 2017-12-14

## 2017-12-14 DIAGNOSIS — N92.0 EXCESSIVE AND FREQUENT MENSTRUATION: Primary | ICD-10-CM

## 2017-12-14 PROCEDURE — 99444 ZZC PHYSICIAN ONLINE EVALUATION & MANAGEMENT SERVICE: CPT | Performed by: FAMILY MEDICINE

## 2017-12-14 RX ORDER — MEDROXYPROGESTERONE ACETATE 5 MG
5 TABLET ORAL DAILY
Qty: 10 TABLET | Refills: 0 | Status: SHIPPED | OUTPATIENT
Start: 2017-12-14 | End: 2017-12-24

## 2017-12-14 NOTE — TELEPHONE ENCOUNTER
Jennifer Gonsalez is a 42 year old female who calls with vaginal bleeding.    NURSING ASSESSMENT:  Description:  Menstrual cycle lasting 14 days  Onset/duration:  14 days ago  Precip. factors:  none  Associated symptoms:  No pain or cramping noted  Improves/worsens symptoms:  unknown  Pain scale (0-10)   0/10  LMP/preg/breast feedin2017  Last exam/Treatment:  2017    Allergies:   Allergies   Allergen Reactions     Morphine Anxiety     Lactase Diarrhea     Diclofenac Rash       MEDICATIONS:   Taking medication(s) as prescribed? N/A  Taking over the counter medication(s?) N/A  Any medication side effects? Not Applicable    Any barriers to taking medication(s) as prescribed?  N/A  Medication(s) improving/managing symptoms?  N/A  Medication reconciliation completed: N/A      NURSING PLAN: Nursing advice to patient evisit or clinic appointment    RECOMMENDED DISPOSITION:  eVisit (through [facility])  Will comply with recommendation: Yes  If further questions/concerns or if symptoms do not improve, worsen or new symptoms develop, call your PCP or [facility] as soon as possible.      Guideline used:  Telephone Triage Protocols for Nurses, Fifth Edition, Liat Van RN   [facility] Triage

## 2017-12-14 NOTE — TELEPHONE ENCOUNTER
Patient has been on her cycle for 14 days  Please call to advise as she couldn't get into see you today due to work    Ok to leave message   [phone]    Thank you

## 2018-11-21 NOTE — TELEPHONE ENCOUNTER
Reason for Call:  Other call back    Detailed comments: patient calling and asking the confirmation date of when the paperwork was originally faxed to the Employer as shes been having issues with with about this paperwork.    Phone Number Patient can be reached at: Home number on file [phone] (home)    Best Time: any time    Can we leave a detailed message on this number? YES    Call taken on 4/27/2017 at 3:52 PM by Nataly Waddell       Vaccine Information Statement(s) for was given today. This has been reviewed, questions answered, and verbal consent given by Parent for injection(s) and administration of Influenza (Inactivated).        Patient tolerated without incident. See immunization grid for documentation.

## 2019-02-15 ENCOUNTER — HEALTH MAINTENANCE LETTER (OUTPATIENT)
Age: 44
End: 2019-02-15

## 2019-05-22 ENCOUNTER — OFFICE VISIT (OUTPATIENT)
Dept: FAMILY MEDICINE | Facility: CLINIC | Age: 44
End: 2019-05-22
Payer: MEDICAID

## 2019-05-22 VITALS
DIASTOLIC BLOOD PRESSURE: 89 MMHG | BODY MASS INDEX: 37.35 KG/M2 | TEMPERATURE: 97.6 F | WEIGHT: 210.8 LBS | OXYGEN SATURATION: 99 % | SYSTOLIC BLOOD PRESSURE: 139 MMHG | HEIGHT: 63 IN | HEART RATE: 109 BPM

## 2019-05-22 DIAGNOSIS — Z11.3 SCREEN FOR STD (SEXUALLY TRANSMITTED DISEASE): ICD-10-CM

## 2019-05-22 DIAGNOSIS — Z12.4 SCREENING FOR MALIGNANT NEOPLASM OF CERVIX: ICD-10-CM

## 2019-05-22 DIAGNOSIS — J45.30 MILD PERSISTENT ASTHMA WITHOUT COMPLICATION: ICD-10-CM

## 2019-05-22 DIAGNOSIS — F51.01 PRIMARY INSOMNIA: ICD-10-CM

## 2019-05-22 DIAGNOSIS — Z00.00 ROUTINE GENERAL MEDICAL EXAMINATION AT A HEALTH CARE FACILITY: Primary | ICD-10-CM

## 2019-05-22 DIAGNOSIS — E66.01 MORBID OBESITY (H): ICD-10-CM

## 2019-05-22 LAB
ALBUMIN SERPL-MCNC: 3.6 G/DL (ref 3.4–5)
ALP SERPL-CCNC: 76 U/L (ref 40–150)
ALT SERPL W P-5'-P-CCNC: 22 U/L (ref 0–50)
ANION GAP SERPL CALCULATED.3IONS-SCNC: 6 MMOL/L (ref 3–14)
AST SERPL W P-5'-P-CCNC: 9 U/L (ref 0–45)
BILIRUB SERPL-MCNC: 0.1 MG/DL (ref 0.2–1.3)
BUN SERPL-MCNC: 13 MG/DL (ref 7–30)
CALCIUM SERPL-MCNC: 8.9 MG/DL (ref 8.5–10.1)
CHLORIDE SERPL-SCNC: 108 MMOL/L (ref 94–109)
CO2 SERPL-SCNC: 27 MMOL/L (ref 20–32)
CREAT SERPL-MCNC: 0.86 MG/DL (ref 0.52–1.04)
GFR SERPL CREATININE-BSD FRML MDRD: 83 ML/MIN/{1.73_M2}
GLUCOSE SERPL-MCNC: 101 MG/DL (ref 70–99)
POTASSIUM SERPL-SCNC: 3.4 MMOL/L (ref 3.4–5.3)
PROT SERPL-MCNC: 7.3 G/DL (ref 6.8–8.8)
SODIUM SERPL-SCNC: 141 MMOL/L (ref 133–144)

## 2019-05-22 PROCEDURE — G0499 HEPB SCREEN HIGH RISK INDIV: HCPCS | Performed by: FAMILY MEDICINE

## 2019-05-22 PROCEDURE — 80053 COMPREHEN METABOLIC PANEL: CPT | Performed by: FAMILY MEDICINE

## 2019-05-22 PROCEDURE — 87491 CHLMYD TRACH DNA AMP PROBE: CPT | Performed by: FAMILY MEDICINE

## 2019-05-22 PROCEDURE — G0476 HPV COMBO ASSAY CA SCREEN: HCPCS | Performed by: FAMILY MEDICINE

## 2019-05-22 PROCEDURE — 36415 COLL VENOUS BLD VENIPUNCTURE: CPT | Performed by: FAMILY MEDICINE

## 2019-05-22 PROCEDURE — 87389 HIV-1 AG W/HIV-1&-2 AB AG IA: CPT | Performed by: FAMILY MEDICINE

## 2019-05-22 PROCEDURE — 87591 N.GONORRHOEAE DNA AMP PROB: CPT | Performed by: FAMILY MEDICINE

## 2019-05-22 PROCEDURE — 86780 TREPONEMA PALLIDUM: CPT | Performed by: FAMILY MEDICINE

## 2019-05-22 PROCEDURE — 99396 PREV VISIT EST AGE 40-64: CPT | Performed by: FAMILY MEDICINE

## 2019-05-22 PROCEDURE — 86803 HEPATITIS C AB TEST: CPT | Performed by: FAMILY MEDICINE

## 2019-05-22 PROCEDURE — G0145 SCR C/V CYTO,THINLAYER,RESCR: HCPCS | Performed by: FAMILY MEDICINE

## 2019-05-22 PROCEDURE — 99213 OFFICE O/P EST LOW 20 MIN: CPT | Mod: 25 | Performed by: FAMILY MEDICINE

## 2019-05-22 RX ORDER — ALBUTEROL SULFATE 90 UG/1
2 AEROSOL, METERED RESPIRATORY (INHALATION) EVERY 6 HOURS PRN
Qty: 8.5 G | Refills: 1 | Status: SHIPPED | OUTPATIENT
Start: 2019-05-22 | End: 2020-03-17

## 2019-05-22 ASSESSMENT — ANXIETY QUESTIONNAIRES
6. BECOMING EASILY ANNOYED OR IRRITABLE: SEVERAL DAYS
GAD7 TOTAL SCORE: 3
7. FEELING AFRAID AS IF SOMETHING AWFUL MIGHT HAPPEN: NOT AT ALL
2. NOT BEING ABLE TO STOP OR CONTROL WORRYING: NOT AT ALL
5. BEING SO RESTLESS THAT IT IS HARD TO SIT STILL: NOT AT ALL
1. FEELING NERVOUS, ANXIOUS, OR ON EDGE: NOT AT ALL
3. WORRYING TOO MUCH ABOUT DIFFERENT THINGS: SEVERAL DAYS

## 2019-05-22 ASSESSMENT — PATIENT HEALTH QUESTIONNAIRE - PHQ9
SUM OF ALL RESPONSES TO PHQ QUESTIONS 1-9: 4
5. POOR APPETITE OR OVEREATING: SEVERAL DAYS

## 2019-05-22 ASSESSMENT — PAIN SCALES - GENERAL: PAINLEVEL: NO PAIN (0)

## 2019-05-22 ASSESSMENT — MIFFLIN-ST. JEOR: SCORE: 1586.43

## 2019-05-22 NOTE — PROGRESS NOTES
SUBJECTIVE:   CC: Jennifer Gonsalez is an 43 year old woman who presents for preventive health visit.     Healthy Habits:    Do you get at least three servings of calcium containing foods daily (dairy, green leafy vegetables, etc.)? no    Amount of exercise or daily activities, outside of work: 5 day(s) per week, hour long    Problems taking medications regularly No    Medication side effects: No    Have you had an eye exam in the past two years? Yes, today    Do you see a dentist twice per year? no    Do you have sleep apnea, excessive snoring or daytime drowsiness?no      Asthma - stable with current treatment.  Using albuterol very occasionally but need refill.    Hypertension Follow-up      Do you check your blood pressure regularly outside of the clinic? No     Are you following a low salt diet? No    Are your blood pressures ever more than 140 on the top number (systolic) OR more   than 90 on the bottom number (diastolic), for example 140/90? No, pt is not sure. does not check at home  Not taking medication for over a year    Anxiety/Insomnia Follow-Up    How are you doing with your anxiety since your last visit? Worsened     Are you having other symptoms that might be associated with anxiety? No    Have you had a significant life event? No     Are you feeling depressed? No    Do you have any concerns with your use of alcohol or other drugs? No    Social History     Tobacco Use     Smoking status: Current Some Day Smoker     Packs/day: 0.20     Types: Cigarettes     Smokeless tobacco: Never Used     Tobacco comment: 1 cig per day   Substance Use Topics     Alcohol use: Yes     Alcohol/week: 0.0 oz     Comment: not often     Drug use: No     JUAN M-7 SCORE 5/22/2019   Total Score 3     PHQ 5/22/2019   PHQ-9 Total Score 4   Q9: Thoughts of better off dead/self-harm past 2 weeks Not at all           Amount of exercise or physical activity: 4-5 days/week for an average of greater than 60 minutes    Problems taking  medications regularly: No    Medication side effects: none    Diet: regular (no restrictions)      Asthma Follow-Up    Was ACT completed today?    Yes    ACT Total Scores 5/22/2019   ACT TOTAL SCORE -   ASTHMA ER VISITS -   ASTHMA HOSPITALIZATIONS -   ACT TOTAL SCORE (Goal Greater than or Equal to 20) 24   In the past 12 months, how many times did you visit the emergency room for your asthma without being admitted to the hospital? 0   In the past 12 months, how many times were you hospitalized overnight because of your asthma? 0       How many days per week do you miss taking your asthma controller medication?  7    Please describe any recent triggers for your asthma: cold air and the weather    Have you had any Emergency Room Visits, Urgent Care Visits, or Hospital Admissions since your last office visit?  No        Amount of exercise or physical activity: 4-5 days/week for an average of greater than 60 minutes    Problems taking medications regularly: No    Medication side effects: none    Diet: regular (no restrictions)                Today's PHQ-2 Score:   PHQ-2 ( 1999 Pfizer) 5/22/2019 3/29/2017   Q1: Little interest or pleasure in doing things 0 0   Q2: Feeling down, depressed or hopeless 0 0   PHQ-2 Score 0 0       Abuse: Current or Past(Physical, Sexual or Emotional)- No  Do you feel safe in your environment? Yes    Social History     Tobacco Use     Smoking status: Current Some Day Smoker     Packs/day: 0.20     Types: Cigarettes     Smokeless tobacco: Never Used     Tobacco comment: 1 cig per day   Substance Use Topics     Alcohol use: Yes     Alcohol/week: 0.0 oz     Comment: not often     If you drink alcohol do you typically have >3 drinks per day or >7 drinks per week? No                     Reviewed orders with patient.  Reviewed health maintenance and updated orders accordingly - Yes  Labs reviewed in [facility]    Mammogram Screening: Patient under age 50, mutual decision reflected in health  "maintenance.      Pertinent mammograms are reviewed under the imaging tab.  History of abnormal Pap smear: NO - age 30-65 PAP every 5 years with negative HPV co-testing recommended  PAP / HPV 12/7/2015 8/9/2012 8/17/2010   PAP NIL NIL NIL     Reviewed and updated as needed this visit by clinical staff  Tobacco  Allergies  Meds  Problems  Med Hx  Surg Hx  Fam Hx  Soc Hx          Reviewed and updated as needed this visit by Provider  Tobacco  Allergies  Meds  Problems  Med Hx  Surg Hx  Fam Hx          ROS:  CONSTITUTIONAL: NEGATIVE for fever, chills, change in weight  INTEGUMENTARU/SKIN: NEGATIVE for worrisome rashes, moles or lesions  EYES: NEGATIVE for vision changes or irritation  ENT: NEGATIVE for ear, mouth and throat problems  RESP: NEGATIVE for significant cough or SOB  BREAST: NEGATIVE for masses, tenderness or discharge  CV: NEGATIVE for chest pain, palpitations or peripheral edema  GI: NEGATIVE for nausea, abdominal pain, heartburn, or change in bowel habits  : NEGATIVE for unusual urinary or vaginal symptoms. Periods are regular.  MUSCULOSKELETAL: NEGATIVE for significant arthralgias or myalgia  NEURO: NEGATIVE for weakness, dizziness or paresthesias  PSYCHIATRIC: NEGATIVE for changes in mood or affect    OBJECTIVE:   /89 (BP Location: Left arm, Patient Position: Chair, Cuff Size: Adult Large)   Pulse 109   Temp 97.6  F (36.4  C) (Tympanic)   Ht 1.61 m (5' 3.39\")   Wt 95.6 kg (210 lb 12.8 oz)   LMP 09/09/2016 (Exact Date)   SpO2 99%   Breastfeeding? No   BMI 36.89 kg/m    EXAM:  GENERAL: healthy, alert, no distress and obese  EYES: Eyes grossly normal to inspection, PERRL and conjunctivae and sclerae normal  HENT: ear canals and TM's normal, nose and mouth without ulcers or lesions  NECK: no adenopathy, no asymmetry, masses, or scars and thyroid normal to palpation  RESP: lungs clear to auscultation - no rales, rhonchi or wheezes  BREAST: normal without masses, tenderness or " nipple discharge and no palpable axillary masses or adenopathy  CV: regular rate and rhythm, normal S1 S2, no S3 or S4, no murmur, click or rub, no peripheral edema and peripheral pulses strong  ABDOMEN: soft, nontender, no hepatosplenomegaly, no masses and bowel sounds normal   (female): normal female external genitalia, normal urethral meatus, vaginal mucosa pink, moist, well rugated, and normal cervix/adnexa/uterus without masses or discharge  MS: no gross musculoskeletal defects noted, no edema  SKIN: no suspicious lesions or rashes  NEURO: Normal strength and tone, mentation intact and speech normal  PSYCH: mentation appears normal, affect normal/bright    Diagnostic Test Results:  Labs reviewed in Epic    ASSESSMENT/PLAN:   1. Routine general medical examination at a health care facility  Routine preventive discussed    2. Screening for malignant neoplasm of cervix  screening  - Pap imaged thin layer screen with HPV - recommended age 30 - 65 years (select HPV order below)    3. Morbid obesity (H)  Low carb diet    4. Mild persistent asthma without acute exacerbation  Refilled  - albuterol (PROAIR HFA/PROVENTIL HFA/VENTOLIN HFA) 108 (90 Base) MCG/ACT inhaler; Inhale 2 puffs into the lungs every 6 hours as needed for shortness of breath / dyspnea  Dispense: 8.5 g; Refill: 1    5. Primary insomnia  Refilled.  - doxylamine (UNISOM) 25 MG TABS tablet; Take 1 tablet (25 mg) by mouth At Bedtime  Dispense: 90 each; Refill: 3  - Comprehensive metabolic panel    6. Screen for STD (sexually transmitted disease)  Screening  - Chlamydia trachomatis PCR  - Neisseria gonorrhoeae PCR  - Treponema Abs w Reflex to RPR and Titer  - HIV Antigen Antibody Combo  - Hepatitis C antibody  - Hepatitis B surface antigen    The uses and side effects, including black box warnings as appropriate, were discussed in detail.  All patient questions were answered.  The patient was instructed to call immediately if any side effects  "developed.      Follow up in 1 month for BP and weight check.    COUNSELING:   Reviewed preventive health counseling, as reflected in patient instructions    Estimated body mass index is 36.89 kg/m  as calculated from the following:    Height as of this encounter: 1.61 m (5' 3.39\").    Weight as of this encounter: 95.6 kg (210 lb 12.8 oz).    Weight management plan: Discussed healthy diet and exercise guidelines     reports that she has been smoking cigarettes.  She has been smoking about 0.20 packs per day. She has never used smokeless tobacco.  Tobacco Cessation Action Plan: Information offered: Patient not interested at this time    Counseling Resources:  ATP IV Guidelines  Pooled Cohorts Equation Calculator  Breast Cancer Risk Calculator  FRAX Risk Assessment  ICSI Preventive Guidelines  Dietary Guidelines for Americans, 2010  USDA's MyPlate  ASA Prophylaxis  Lung CA Screening    Myla Obrien MD  [facility]  "

## 2019-05-23 LAB
C TRACH DNA SPEC QL NAA+PROBE: NEGATIVE
HBV SURFACE AG SERPL QL IA: NONREACTIVE
HCV AB SERPL QL IA: NONREACTIVE
HIV 1+2 AB+HIV1 P24 AG SERPL QL IA: NONREACTIVE
N GONORRHOEA DNA SPEC QL NAA+PROBE: NEGATIVE
SPECIMEN SOURCE: NORMAL
SPECIMEN SOURCE: NORMAL

## 2019-05-23 ASSESSMENT — ASTHMA QUESTIONNAIRES: ACT_TOTALSCORE: 24

## 2019-05-23 ASSESSMENT — ANXIETY QUESTIONNAIRES: GAD7 TOTAL SCORE: 3

## 2019-05-24 LAB — T PALLIDUM AB SER QL: NONREACTIVE

## 2019-05-24 NOTE — RESULT ENCOUNTER NOTE
MsRusty Gonsalez,    Your liver function tests are normal.  Your kidney function was normal.  Neither hepatitis B, hepatitis C, HIV, syphilis, gonorrhea nor chlamydia were found.     Please contact the clinic if you have additional questions.  Thank you.    Sincerely,    Myla Obrien

## 2019-05-28 LAB
COPATH REPORT: NORMAL
PAP: NORMAL

## 2019-05-29 LAB
FINAL DIAGNOSIS: NORMAL
HPV HR 12 DNA CVX QL NAA+PROBE: NEGATIVE
HPV16 DNA SPEC QL NAA+PROBE: NEGATIVE
HPV18 DNA SPEC QL NAA+PROBE: NEGATIVE
SPECIMEN DESCRIPTION: NORMAL
SPECIMEN SOURCE CVX/VAG CYTO: NORMAL

## 2019-06-03 ENCOUNTER — TELEPHONE (OUTPATIENT)
Dept: FAMILY MEDICINE | Facility: CLINIC | Age: 44
End: 2019-06-03

## 2019-06-03 NOTE — TELEPHONE ENCOUNTER
Called and spoke to patient and gave her Dr Toya Obrien's message and scheduled patient an appointment on 6/12/19 at 9:00.  Rhianna Pastor MA/  [facility]

## 2019-06-03 NOTE — TELEPHONE ENCOUNTER
Discussed at physical that patient will need to follow up 1 month later to have BP and weight recheck prior to prescription.    Myla Mujica M.D.

## 2019-06-03 NOTE — TELEPHONE ENCOUNTER
Reason for Call:  Medication or medication refill:    Do you use a [facility]?yes  Name of the pharmacy and phone number for the current request:  [facility] - [address] [address] - [phone]    Name of the medication requested: Phentermine    Other request: Patient was told by provider if chest xray came out good - diet pill can be prescribed and they did.     Can we leave a detailed message on this number? YES    Phone number patient can be reached at: Cell number on file:    Telephone Information:   Mobile [phone]       Best Time: anytime    Call taken on 6/3/2019 at 2:04 PM by Jolene Heck

## 2019-06-12 ENCOUNTER — OFFICE VISIT (OUTPATIENT)
Dept: FAMILY MEDICINE | Facility: CLINIC | Age: 44
End: 2019-06-12
Payer: COMMERCIAL

## 2019-06-12 VITALS
TEMPERATURE: 97.1 F | HEART RATE: 94 BPM | HEIGHT: 63 IN | DIASTOLIC BLOOD PRESSURE: 106 MMHG | WEIGHT: 207.2 LBS | OXYGEN SATURATION: 98 % | BODY MASS INDEX: 36.71 KG/M2 | SYSTOLIC BLOOD PRESSURE: 154 MMHG

## 2019-06-12 DIAGNOSIS — I10 HYPERTENSION GOAL BP (BLOOD PRESSURE) < 140/90: Primary | ICD-10-CM

## 2019-06-12 DIAGNOSIS — F51.01 PRIMARY INSOMNIA: ICD-10-CM

## 2019-06-12 DIAGNOSIS — E66.09 NON MORBID OBESITY DUE TO EXCESS CALORIES: ICD-10-CM

## 2019-06-12 PROCEDURE — 99214 OFFICE O/P EST MOD 30 MIN: CPT | Performed by: FAMILY MEDICINE

## 2019-06-12 RX ORDER — HYDROXYZINE HYDROCHLORIDE 25 MG/1
25-50 TABLET, FILM COATED ORAL
Qty: 90 TABLET | Refills: 1 | Status: SHIPPED | OUTPATIENT
Start: 2019-06-12 | End: 2020-01-06

## 2019-06-12 ASSESSMENT — MIFFLIN-ST. JEOR: SCORE: 1570.1

## 2019-06-12 ASSESSMENT — PAIN SCALES - GENERAL: PAINLEVEL: SEVERE PAIN (7)

## 2019-06-12 NOTE — PROGRESS NOTES
"Subjective     Jennifer Gonsalez is a 43 year old female who presents to clinic today for the following health issues:    HPI     Insomnia  Onset: \"always had it\"     Description:   Time to fall asleep (sleep latency): 2 hours  Middle of night awakening:  YES  Early morning awakening:  YES    Progression of Symptoms:  worsening    Accompanying Signs & Symptoms:  Daytime sleepiness/napping: YES  Excessive snoring/apnea: no   Restless legs: no   Frequent urination: no   Chronic pain:  no     History:  Prior Insomnia: no     Precipitating factors:   New stressful situation: no   Caffeine intake: YES, coffee a cup a day  OTC decongestants: no   Any new medications: no     Alleviating factors:  Self medicating (alcohol, etc.):  no    Therapies Tried and outcome: melatonin but uses more than should- 10 pills a day sometimes.  Tried daughters elavil and better able to sleep           Hypertension Follow-up      Do you check your blood pressure regularly outside of the clinic? No     Are you following a low salt diet? No    Are your blood pressures ever more than 140 on the top number (systolic) OR more   than 90 on the bottom number (diastolic), for example 140/90? No    Amount of exercise or physical activity: 6-7 days/week for an average of 30-45 minutes    Problems taking medications regularly: No    Medication side effects: none    Diet: regular (no restrictions)\    Had a lot of salt earlier this week.      Headache  Onset: 2 days    Description:   Location: bilateral in the frontal area   Character: throbbing pain, squeezing pain  Frequency:  intermittent  Duration:  hours    Intensity: moderate, severe    Progression of Symptoms:  worsening    Accompanying Signs & Symptoms:  Stiff neck: YES  Neck or upper back pain: no   Fever: no   Sinus pressure: YES  Nausea or vomiting: YES  Dizziness: YES  Numbness: no   Weakness: no   Visual changes: no     History:   Head trauma: no   Family history of migraines: no   Previous " "tests for headaches: no   Neurologist evaluations: no   Able to do daily activities: no   Wake with a headaches: YES  Do headaches wake you up: YES  Daily pain medication use: no   Work/school stressors/changes: no     Precipitating factors:   Does light make it worse: no   Does sound make it worse: YES    Alleviating factors:  Does sleep help: no     Therapies Tried and outcome: none    Obesity - Would like to discuss Phentermine for weight loss       Reviewed and updated as needed this visit by Provider  Tobacco  Allergies  Meds  Problems  Med Hx  Surg Hx  Fam Hx         Review of Systems   ROS COMP: Constitutional, HEENT, cardiovascular, pulmonary, gi and gu systems are negative, except as otherwise noted.      Objective    BP (!) 154/106 (BP Location: Left arm)   Pulse 94   Temp 97.1  F (36.2  C) (Tympanic)   Ht 1.61 m (5' 3.39\")   Wt 94 kg (207 lb 3.2 oz)   LMP 09/09/2016 (Exact Date)   SpO2 98%   Breastfeeding? No   BMI 36.26 kg/m    Body mass index is 36.26 kg/m .  Physical Exam   GENERAL: healthy, alert and no distress  EYES: Eyes grossly normal to inspection, PERRL and conjunctivae and sclerae normal  HENT: ear canals and TM's normal, nose and mouth without ulcers or lesions  NECK: no adenopathy, no asymmetry, masses, or scars and thyroid normal to palpation  RESP: lungs clear to auscultation - no rales, rhonchi or wheezes  CV: regular rate and rhythm, normal S1 S2, no S3 or S4, no murmur, click or rub, no peripheral edema and peripheral pulses strong  ABDOMEN: soft, nontender, no hepatosplenomegaly, no masses and bowel sounds normal  MS: no gross musculoskeletal defects noted, no edema  NEURO: Normal strength and tone, mentation intact and speech normal  PSYCH: mentation appears normal, affect normal/bright    Diagnostic Test Results:  Labs reviewed in Epic        Assessment & Plan     1. Hypertension goal BP (blood pressure) < 140/90  Elevated today - discussed low sodium diet and side " "effect of elavil.  Patient is resistant to restarting medication so will reduce salt intake and return in 1 week for a recheck    2. Non morbid obesity due to excess calories  Discussed side effects of phentermine - will prescribe if BP controlled without medication    3. Primary insomnia  Unisom not covered and elavil contraindicated due to BP - trial of hydroxyzine.  - hydrOXYzine (ATARAX) 25 MG tablet; Take 1-2 tablets (25-50 mg) by mouth nightly as needed (for sleep)  Dispense: 90 tablet; Refill: 1     Tobacco Cessation:   reports that she has been smoking cigarettes.  She has been smoking about 0.20 packs per day. She has never used smokeless tobacco.  Tobacco Cessation Action Plan: Information offered: Patient not interested at this time      BMI:   Estimated body mass index is 36.26 kg/m  as calculated from the following:    Height as of this encounter: 1.61 m (5' 3.39\").    Weight as of this encounter: 94 kg (207 lb 3.2 oz).   Weight management plan: Discussed healthy diet and exercise guidelines    The uses and side effects, including black box warnings as appropriate, were discussed in detail.  All patient questions were answered.  The patient was instructed to call immediately if any side effects developed.     Return in about 1 week (around 6/19/2019) for Ancillary Visit.    yMla Obrien MD  [facility]      "

## 2019-06-12 NOTE — PATIENT INSTRUCTIONS
Patient Education     Controlling High Blood Pressure  High blood pressure (hypertension) is often called the silent killer. This is because many people who have it don t know it. High blood pressure can raise your risk of heart attack, stroke, and heart failure. Controlling your blood pressure can decrease your risk of these problems. Know your blood pressure and remember to check it regularly. Doing so can save your life.  Blood pressure measurements are given as 2 numbers. Systolic blood pressure is the upper number. This is the pressure when the heart contracts. Diastolic blood pressure is the lower number. This is the pressure when the heart relaxes between beats.  Blood pressure is categorized as normal, elevated, or stage 1 or stage 2 high blood pressure:    Normal blood pressure is systolic of less than 120 and diastolic of less than 80 (120/80)    Elevated blood pressure is systolic of 120 to 129 and diastolic less than 80    Stage 1 high blood pressure is systolic is 130 to 139 or diastolic between 80 to 89    Stage 2 high blood pressure is when systolic is 140 or higher or the diastolic is 90 or higher  Here are some things you can do to help control your blood pressure.    Choose heart-healthy foods    Select low-salt, low-fat foods. Limit sodium intake to 2,400 mg per day or the amount suggested by your healthcare provider.    Limit canned, dried, cured, packaged, and fast foods. These can contain a lot of salt.    Eat 8 to 10 servings of fruits and vegetables every day.    Choose lean meats, fish, or chicken.    Eat whole-grain pasta, brown rice, and beans.    Eat 2 to 3 servings of low-fat or fat-free dairy products.    Ask your doctor about the DASH eating plan. This plan helps reduce blood pressure.    When you go to a restaurant, ask that your meal be prepared with no added salt.  Maintain a healthy weight    Ask your healthcare provider how many calories to eat a day. Then stick to that number.     Ask your healthcare provider what weight range is healthiest for you. If you are overweight, a weight loss of only 3% to 5% of your body weight can help lower blood pressure. Generally, a good weight loss goal is to lose 10% of your body weight in a year.    Limit snacks and sweets.    Get regular exercise.  Get up and get active    Choose activities you enjoy. Find ones you can do with friends or family. This includes bicycling, dancing, walking, and jogging.    Park farther away from building entrances.    Use stairs instead of the elevator.    When you can, walk or bike instead of driving.    Skillman leaves, garden, or do household repairs.    Be active at a moderate to vigorous level of physical activity for at least 40 minutes for a minimum of 3 to 4 days a week.   Manage stress    Make time to relax and enjoy life. Find time to laugh.    Communicate your concerns with your loved ones and your healthcare provider.    Visit with family and friends, and keep up with hobbies.  Limit alcohol and quit smoking    Men should have no more than 2 drinks per day.    Women should have no more than 1 drink per day.    Talk with your healthcare provider about quitting smoking. Smoking significantly increases your risk for heart disease and stroke. Ask your healthcare provider about community smoking cessation programs and other options.  Medicines  If lifestyle changes aren t enough, your healthcare provider may prescribe high blood pressure medicine. Take all medicines as prescribed. If you have any questions about your medicines, ask your healthcare provider before stopping or changing them.   Date Last Reviewed: 4/27/2016 2000-2018 [employer]. [address] [facility], [place] [address]. All rights reserved. This information is not intended as a substitute for professional medical care. Always follow your healthcare professional's instructions.

## 2019-07-17 ENCOUNTER — OFFICE VISIT (OUTPATIENT)
Dept: FAMILY MEDICINE | Facility: CLINIC | Age: 44
End: 2019-07-17
Payer: COMMERCIAL

## 2019-07-17 VITALS
DIASTOLIC BLOOD PRESSURE: 80 MMHG | HEIGHT: 63 IN | OXYGEN SATURATION: 100 % | SYSTOLIC BLOOD PRESSURE: 119 MMHG | BODY MASS INDEX: 36.68 KG/M2 | TEMPERATURE: 98.6 F | WEIGHT: 207 LBS | HEART RATE: 80 BPM | RESPIRATION RATE: 16 BRPM

## 2019-07-17 DIAGNOSIS — E66.01 MORBID OBESITY (H): ICD-10-CM

## 2019-07-17 DIAGNOSIS — I10 HYPERTENSION GOAL BP (BLOOD PRESSURE) < 140/90: Primary | ICD-10-CM

## 2019-07-17 PROBLEM — E66.812 CLASS 2 OBESITY WITHOUT SERIOUS COMORBIDITY WITH BODY MASS INDEX (BMI) OF 36.0 TO 36.9 IN ADULT: Status: ACTIVE | Noted: 2019-05-22

## 2019-07-17 PROCEDURE — 99214 OFFICE O/P EST MOD 30 MIN: CPT | Performed by: FAMILY MEDICINE

## 2019-07-17 RX ORDER — PHENTERMINE HYDROCHLORIDE 30 MG/1
30 CAPSULE ORAL EVERY MORNING
Qty: 30 CAPSULE | Refills: 0 | Status: SHIPPED | OUTPATIENT
Start: 2019-07-17 | End: 2020-01-06

## 2019-07-17 ASSESSMENT — MIFFLIN-ST. JEOR: SCORE: 1569.2

## 2019-07-17 ASSESSMENT — PAIN SCALES - GENERAL: PAINLEVEL: NO PAIN (0)

## 2019-07-17 NOTE — PROGRESS NOTES
"Subjective     Jennifer Gonsalez is a 43 year old female who presents to clinic today for the following health issues:    HPI   Hypertension Follow-up      Do you check your blood pressure regularly outside of the clinic? No     Are you following a low salt diet? Yes    Are your blood pressures ever more than 140 on the top number (systolic) OR more   than 90 on the bottom number (diastolic), for example 140/90? Yes    Amount of exercise or physical activity: 4-5 days/week for an average of 45-60 minutes    Problems taking medications regularly: No    Medication side effects: none    Diet: low salt      Obesity - would like to try weight loss medication.  Failed multiple diets      Reviewed and updated as needed this visit by Provider  Tobacco  Allergies  Meds  Problems  Med Hx  Surg Hx  Fam Hx         Review of Systems   ROS COMP: Constitutional, HEENT, cardiovascular, pulmonary, gi and gu systems are negative, except as otherwise noted.      Objective    /80 (BP Location: Left arm, Patient Position: Sitting, Cuff Size: Adult Large)   Pulse 80   Temp 98.6  F (37  C) (Oral)   Resp 16   Ht 1.61 m (5' 3.39\")   Wt 93.9 kg (207 lb)   LMP 09/09/2016 (Exact Date)   SpO2 100%   BMI 36.22 kg/m    Body mass index is 36.22 kg/m .  Physical Exam   GENERAL: healthy, alert and no distress  NECK: no adenopathy, no asymmetry, masses, or scars and thyroid normal to palpation  RESP: lungs clear to auscultation - no rales, rhonchi or wheezes  CV: regular rate and rhythm, normal S1 S2, no S3 or S4, no murmur, click or rub, no peripheral edema and peripheral pulses strong  ABDOMEN: soft, nontender, no hepatosplenomegaly, no masses and bowel sounds normal  MS: no gross musculoskeletal defects noted, no edema  NEURO: Normal strength and tone, mentation intact and speech normal  PSYCH: mentation appears normal, affect normal/bright    Diagnostic Test Results:  Labs reviewed in Epic        Assessment & Plan     1. " "Hypertension goal BP (blood pressure) < 140/90  Controlled with diet    2. Morbid obesity given HTN (H)  Trial of phentermine and low carb diet.  - phentermine (ADIPEX-P) 30 MG capsule; Take 1 capsule (30 mg) by mouth every morning  Dispense: 30 capsule; Refill: 0     Tobacco Cessation:   reports that she has been smoking cigarettes.  She has been smoking about 0.20 packs per day. She has never used smokeless tobacco.  Tobacco Cessation Action Plan: Information offered: Patient not interested at this time      BMI:   Estimated body mass index is 36.22 kg/m  as calculated from the following:    Height as of this encounter: 1.61 m (5' 3.39\").    Weight as of this encounter: 93.9 kg (207 lb).   Weight management plan: Discussed healthy diet and exercise guidelines      The uses and side effects, including black box warnings as appropriate, were discussed in detail.  All patient questions were answered.  The patient was instructed to call immediately if any side effects developed.     Return in about 4 weeks (around 8/14/2019).    Myla Obrien MD  [facility]      "

## 2019-07-17 NOTE — PATIENT INSTRUCTIONS
At [facility], we strive to deliver an exceptional experience to you, every time we see you.  If you receive a survey in the mail, please send us back your thoughts. We really do value your feedback.    Based on your medical history, these are the current health maintenance/preventive care services that you are due for (some may have been done at this visit.)  Health Maintenance Due   Topic Date Due     ASTHMA ACTION PLAN  12/07/2017         Suggested websites for health information:  Www.[employer].org : Up to date and easily searchable information on multiple topics.  Www.medlineplus.gov : medication info, interactive tutorials, watch real surgeries online  Www.familydoctor.org : good info from the Academy of Family Physicians  Www.cdc.gov : public health info, travel advisories, epidemics (H1N1)  Www.aap.org : children's health info, normal development, vaccinations  Www.health.[facility].[place].us : [place] dept of health, public health issues in [place], N1N1    Your care team:                            Family Medicine Internal Medicine   MD Nathan Leslie MD Shantel Branch-Fleming, MD Katya Georgiev PA-C Nam Ho, MD Pediatrics   IVAN Pascual, MD Nano Collins CNP, MD Deborah Mielke, MD Kim Thein, APRN CNP      Clinic hours: Monday - Thursday 7 am-7 pm; Fridays 7 am-5 pm.   Urgent care: Monday - Friday 11 am-9 pm; Saturday and Sunday 9 am-5 pm.  Pharmacy : Monday -Thursday 8 am-8 pm; Friday 8 am-6 pm; Saturday and Sunday 9 am-5 pm.     Clinic: [phone]   Pharmacy: [phone]

## 2019-07-22 ENCOUNTER — TELEPHONE (OUTPATIENT)
Dept: FAMILY MEDICINE | Facility: CLINIC | Age: 44
End: 2019-07-22

## 2019-07-22 ENCOUNTER — OFFICE VISIT (OUTPATIENT)
Dept: FAMILY MEDICINE | Facility: CLINIC | Age: 44
End: 2019-07-22
Payer: COMMERCIAL

## 2019-07-22 VITALS
DIASTOLIC BLOOD PRESSURE: 87 MMHG | HEART RATE: 80 BPM | HEIGHT: 63 IN | TEMPERATURE: 98.5 F | SYSTOLIC BLOOD PRESSURE: 131 MMHG | OXYGEN SATURATION: 100 % | RESPIRATION RATE: 16 BRPM | WEIGHT: 201 LBS | BODY MASS INDEX: 35.61 KG/M2

## 2019-07-22 DIAGNOSIS — S01.311S LACERATION OF RIGHT EAR LOBE, SEQUELA: Primary | ICD-10-CM

## 2019-07-22 PROCEDURE — 99213 OFFICE O/P EST LOW 20 MIN: CPT | Performed by: NURSE PRACTITIONER

## 2019-07-22 RX ORDER — DOXYLAMINE SUCCINATE 25 MG/1
TABLET ORAL
COMMUNITY
Start: 2019-07-17 | End: 2022-04-06

## 2019-07-22 ASSESSMENT — PAIN SCALES - GENERAL: PAINLEVEL: NO PAIN (0)

## 2019-07-22 ASSESSMENT — MIFFLIN-ST. JEOR: SCORE: 1541.98

## 2019-07-22 NOTE — TELEPHONE ENCOUNTER
"Patient is on my schedule at 12:20 today for \"stich up ripped ear.\" The appointment was made 3 days ago. Please call patient and get more info. I do not suture if the wound is more than 24 hours old. I'm happy to take a look and see if anything else can be done or refer if needed.  "

## 2019-07-22 NOTE — PROGRESS NOTES
Subjective     Jennifer Gonsalez is a 43 year old female who presents to clinic today for the following health issues:    HPI   Concern - Ear Injury  Onset: 6 months ago    Description:   Right ear injury    Intensity: mild    Progression of Symptoms:  same    Accompanying Signs & Symptoms:  None    Previous history of similar problem:   None    Precipitating factors:   Worsened by: None    Alleviating factors:  Improved by: None    Therapies Tried and outcome: None    43 year old female presents to discuss 2 ear lobe lacerations that occurred 6-7 months ago. She reports she used to wear large hoop earrings and they pulled through her ear. She would like the lobes repaired.     Patient Active Problem List   Diagnosis     Mild persistent asthma     CARDIOVASCULAR SCREENING; LDL GOAL LESS THAN 160     GERD (gastroesophageal reflux disease)     Hypertension goal BP (blood pressure) < 140/90     Seasonal allergic rhinitis     Acute reaction to stress     Tobacco use disorder     Non morbid obesity due to excess calories     Submental mass     Empyema (H)     Hormone replacement therapy (HRT)     Primary insomnia     Other iron deficiency anemia     Obesity with body mass index 30 or greater     Class 2 obesity without serious comorbidity with body mass index (BMI) of 36.0 to 36.9 in adult     Obesity (BMI 35.0-39.9) with comorbidity (H)     Past Surgical History:   Procedure Laterality Date     C EXCIS UTERINE FIBROID,ABD APPRCH  2014     CHOLECYSTECTOMY, LAPOROSCOPIC  2014    Cholecystectomy, Laparoscopic     GENERAL SURGERY                           DATE: 04/2017      Colocutaneous fistula.     HERNIA REPAIR, UMBILICAL  2014     MAMMOPLASTY REDUCTION  2000    [place]     SALPINGO-OOPHORECTOMY BILATERAL  2016    Open BSO for bilateral TOA     THORACENTESIS  2016    inflammatory pleural infusion       Social History     Tobacco Use     Smoking status: Current Some Day Smoker     Packs/day: 0.20     Types: Cigarettes      "Smokeless tobacco: Never Used     Tobacco comment: 1 cig per day   Substance Use Topics     Alcohol use: Yes     Alcohol/week: 0.0 oz     Comment: not often     History reviewed. No pertinent family history.      Current Outpatient Medications   Medication Sig Dispense Refill     phentermine (ADIPEX-P) 30 MG capsule Take 1 capsule (30 mg) by mouth every morning 30 capsule 0     albuterol (2.5 MG/3ML) 0.083% nebulizer solution Take 1 vial (2.5 mg) by nebulization every 6 hours as needed for shortness of breath / dyspnea (Patient not taking: Reported on 7/22/2019) 60 vial 6     albuterol (PROAIR HFA/PROVENTIL HFA/VENTOLIN HFA) 108 (90 Base) MCG/ACT inhaler Inhale 2 puffs into the lungs every 6 hours as needed for shortness of breath / dyspnea (Patient not taking: Reported on 7/22/2019) 8.5 g 1     hydrOXYzine (ATARAX) 25 MG tablet Take 1-2 tablets (25-50 mg) by mouth nightly as needed (for sleep) (Patient not taking: Reported on 7/22/2019) 90 tablet 1     order for DME Equipment being ordered: Blood pressure monitor. (Patient not taking: Reported on 7/22/2019) 1 Device 0     SM SLEEP AID 25 MG TABS tablet        Allergies   Allergen Reactions     Morphine Anxiety     Lactase Diarrhea     Diclofenac Rash     BP Readings from Last 3 Encounters:   07/22/19 131/87   07/17/19 119/80   06/12/19 (!) 154/106    Wt Readings from Last 3 Encounters:   07/22/19 91.2 kg (201 lb)   07/17/19 93.9 kg (207 lb)   06/12/19 94 kg (207 lb 3.2 oz)                    Reviewed and updated as needed this visit by Provider         Review of Systems   ROS COMP: Constitutional, HEENT, cardiovascular, pulmonary, gi and gu systems are negative, except as otherwise noted.      Objective    /87 (BP Location: Right arm, Patient Position: Chair, Cuff Size: Adult Large)   Pulse 80   Temp 98.5  F (36.9  C) (Oral)   Resp 16   Ht 1.61 m (5' 3.39\")   Wt 91.2 kg (201 lb)   LMP 06/22/2019 (Approximate)   SpO2 100%   Breastfeeding? No   BMI " "35.17 kg/m    Body mass index is 35.17 kg/m .  Physical Exam   GENERAL: healthy, alert and no distress  SKIN: 2 healed lacerations from earring holes   PSYCH: mentation appears normal, affect normal/bright    Diagnostic Test Results:  none         Assessment & Plan     1. Laceration of right ear lobe, sequela  Will refer to plastic surgery for repair.   - PLASTIC SURGERY REFERRAL     Tobacco Cessation:   reports that she has been smoking cigarettes.  She has been smoking about 0.20 packs per day. She has never used smokeless tobacco.  Tobacco Cessation Action Plan: Information offered: Patient not interested at this time      BMI:   Estimated body mass index is 35.17 kg/m  as calculated from the following:    Height as of this encounter: 1.61 m (5' 3.39\").    Weight as of this encounter: 91.2 kg (201 lb).           See Patient Instructions    Return in about 4 weeks (around 8/19/2019), or if symptoms worsen or fail to improve.     Return precautions discussed, including when to seek urgent/emergent care.    Patient verbalizes understanding and agrees with plan of care. Patient stable for discharge.      JOSUE German [facility]      "

## 2019-07-22 NOTE — TELEPHONE ENCOUNTER
"Called and spoke with patient regarding reason for appointment today.  This is not a new or recent injury. Had earring pulled out of ear \"long time ago\". Has slit in ear.  Was looking for it to be closed up.  Discussed with patient that this would not be stitched up today in office, can only suture recent, fresh injuries (usually within 24 hours). Advised that patient can still come to apt to have problem looked at and likely a referral can be given to appropriate specialty for repair.  Patient agreed and understanding, is aware will not be sutured today and will discuss further at time of apt.    Alexandria Kelly RN      "

## 2019-08-12 ENCOUNTER — TELEPHONE (OUTPATIENT)
Dept: SURGERY | Facility: CLINIC | Age: 44
End: 2019-08-12

## 2019-08-12 NOTE — TELEPHONE ENCOUNTER
PREVISIT INFORMATION                                                    Jennifer Gonsalez scheduled for future visit at [facility] specialty clinics.    Patient is scheduled to see Dr. Rojas on 8/16/19  Reason for visit: earlobe repair consult  Referring provider JOUSE Macias CNP  Has patient seen previous specialist? No  Medical Records:  Available in chart.  Patient was previously seen at a [facility] or [facility] facility.    REVIEW                                                      New patient packet mailed to patient: No  Medication reconciliation complete: No      Current Outpatient Medications   Medication Sig Dispense Refill     albuterol (2.5 MG/3ML) 0.083% nebulizer solution Take 1 vial (2.5 mg) by nebulization every 6 hours as needed for shortness of breath / dyspnea (Patient not taking: Reported on 7/22/2019) 60 vial 6     albuterol (PROAIR HFA/PROVENTIL HFA/VENTOLIN HFA) 108 (90 Base) MCG/ACT inhaler Inhale 2 puffs into the lungs every 6 hours as needed for shortness of breath / dyspnea (Patient not taking: Reported on 7/22/2019) 8.5 g 1     hydrOXYzine (ATARAX) 25 MG tablet Take 1-2 tablets (25-50 mg) by mouth nightly as needed (for sleep) (Patient not taking: Reported on 7/22/2019) 90 tablet 1     order for DME Equipment being ordered: Blood pressure monitor. (Patient not taking: Reported on 7/22/2019) 1 Device 0     phentermine (ADIPEX-P) 30 MG capsule Take 1 capsule (30 mg) by mouth every morning 30 capsule 0     SM SLEEP AID 25 MG TABS tablet          Allergies: Morphine; Lactase; and Diclofenac        PLAN/FOLLOW-UP NEEDED                                                      Previsit review complete.  Patient will see provider at future scheduled appointment.    Writer called and spoke with patient with appointment reminder.     Patient Reminders Given:  Please, make sure you bring an updated list of your medications.   If you are having a procedure, please,  present 15 minutes early.  If you need to cancel or reschedule,please call [phone].    Livia Crawford, KENNEDIN

## 2019-11-05 ENCOUNTER — HEALTH MAINTENANCE LETTER (OUTPATIENT)
Age: 44
End: 2019-11-05

## 2020-01-06 ENCOUNTER — OFFICE VISIT (OUTPATIENT)
Dept: FAMILY MEDICINE | Facility: CLINIC | Age: 45
End: 2020-01-06
Payer: COMMERCIAL

## 2020-01-06 VITALS
OXYGEN SATURATION: 100 % | WEIGHT: 198.4 LBS | DIASTOLIC BLOOD PRESSURE: 82 MMHG | HEIGHT: 64 IN | SYSTOLIC BLOOD PRESSURE: 139 MMHG | HEART RATE: 84 BPM | BODY MASS INDEX: 33.87 KG/M2 | TEMPERATURE: 98.3 F

## 2020-01-06 DIAGNOSIS — F51.01 PRIMARY INSOMNIA: ICD-10-CM

## 2020-01-06 DIAGNOSIS — E66.01 MORBID OBESITY (H): ICD-10-CM

## 2020-01-06 PROCEDURE — 99213 OFFICE O/P EST LOW 20 MIN: CPT | Performed by: FAMILY MEDICINE

## 2020-01-06 RX ORDER — PHENTERMINE HYDROCHLORIDE 30 MG/1
30 CAPSULE ORAL EVERY MORNING
Qty: 30 CAPSULE | Refills: 0 | Status: SHIPPED | OUTPATIENT
Start: 2020-01-06 | End: 2020-06-03

## 2020-01-06 RX ORDER — HYDROXYZINE HYDROCHLORIDE 25 MG/1
25-50 TABLET, FILM COATED ORAL
Qty: 90 TABLET | Refills: 1 | Status: CANCELLED | OUTPATIENT
Start: 2020-01-06

## 2020-01-06 ASSESSMENT — PAIN SCALES - GENERAL: PAINLEVEL: NO PAIN (0)

## 2020-01-06 ASSESSMENT — MIFFLIN-ST. JEOR: SCORE: 1527

## 2020-01-07 NOTE — PATIENT INSTRUCTIONS
At [facility], we strive to deliver an exceptional experience to you, every time we see you.  If you receive a survey in the mail, please send us back your thoughts. We really do value your feedback.    Based on your medical history, these are the current health maintenance/preventive care services that you are due for (some may have been done at this visit.)  Health Maintenance Due   Topic Date Due     PNEUMOCOCCAL IMMUNIZATION 19-64 MEDIUM RISK (1 of 1 - PPSV23) 09/11/1994     ASTHMA ACTION PLAN  12/07/2017     INFLUENZA VACCINE (1) 09/01/2019     BMP  11/22/2019     ASTHMA CONTROL TEST  11/22/2019     PHQ-2  01/01/2020         Suggested websites for health information:  Www.[employer].org : Up to date and easily searchable information on multiple topics.  Www.medlineplus.gov : medication info, interactive tutorials, watch real surgeries online  Www.familydoctor.org : good info from the Academy of Family Physicians  Www.cdc.gov : public health info, travel advisories, epidemics (H1N1)  Www.aap.org : children's health info, normal development, vaccinations  Www.health.state.[place].us : [place] dept of health, public health issues in [place], N1N1    Your care team:                            Family Medicine Internal Medicine   MD Nathan Leslie MD Shantel Branch-Fleming, MD Katya Georgiev PA-C Nam Ho, MD Pediatrics   IVAN Pascual, CHRIS Corrales APRN MD Nano Schultz MD Deborah Mielke, MD Kim Thein, APRN CNP      Clinic hours: Monday - Thursday 7 am-7 pm; Fridays 7 am-5 pm.   Urgent care: Monday - Friday 11 am-9 pm; Saturday and Sunday 9 am-5 pm.  Pharmacy : Monday -Thursday 8 am-8 pm; Friday 8 am-6 pm; Saturday and Sunday 9 am-5 pm.     Clinic: [phone]   Pharmacy: [phone]

## 2020-01-07 NOTE — PROGRESS NOTES
"Subjective     Jennifer Gonsalez is a 44 year old female who presents to clinic today for the following health issues:    HPI   Weight management - was on phentermine over the summer.  Was down to 184 pounds but regained weight with holidays.  Denies side effects or blood pressure elevation while on it.      Reviewed and updated as needed this visit by Provider  Tobacco  Allergies  Meds  Problems  Med Hx  Surg Hx  Fam Hx         Review of Systems   ROS COMP: Constitutional, HEENT, cardiovascular, pulmonary, gi and gu systems are negative, except as otherwise noted.      Objective    /82 (BP Location: Left arm, Patient Position: Chair, Cuff Size: Adult Large)   Pulse 84   Temp 98.3  F (36.8  C) (Oral)   Ht 1.613 m (5' 3.5\")   Wt 90 kg (198 lb 6.4 oz)   LMP 06/22/2019 (Approximate)   SpO2 100%   BMI 34.59 kg/m    Body mass index is 34.59 kg/m .  Physical Exam   GENERAL: healthy, alert and no distress  NECK: no adenopathy, no asymmetry, masses, or scars and thyroid normal to palpation  RESP: lungs clear to auscultation - no rales, rhonchi or wheezes  CV: regular rate and rhythm, normal S1 S2, no S3 or S4, no murmur, click or rub, no peripheral edema and peripheral pulses strong  ABDOMEN: soft, nontender, no hepatosplenomegaly, no masses and bowel sounds normal  MS: no gross musculoskeletal defects noted, no edema    Diagnostic Test Results:  Labs reviewed in Epic        Assessment & Plan     1. Morbid obesity (H)  Resume use - follow up in 1 month  - phentermine (ADIPEX-P) 30 MG capsule; Take 1 capsule (30 mg) by mouth every morning  Dispense: 30 capsule; Refill: 0    2. Primary insomnia  Doing well with melatonin.       BMI:   Estimated body mass index is 34.59 kg/m  as calculated from the following:    Height as of this encounter: 1.613 m (5' 3.5\").    Weight as of this encounter: 90 kg (198 lb 6.4 oz).   Weight management plan: Discussed healthy diet and exercise guidelines    The uses and side " effects, including black box warnings as appropriate, were discussed in detail.  All patient questions were answered.  The patient was instructed to call immediately if any side effects developed.     Return in about 4 weeks (around 2/3/2020).    Myla Obrien MD  [facility]

## 2020-03-17 ENCOUNTER — MYC REFILL (OUTPATIENT)
Dept: FAMILY MEDICINE | Facility: CLINIC | Age: 45
End: 2020-03-17

## 2020-03-17 ENCOUNTER — MYC MEDICAL ADVICE (OUTPATIENT)
Dept: FAMILY MEDICINE | Facility: CLINIC | Age: 45
End: 2020-03-17

## 2020-03-17 DIAGNOSIS — J45.30 MILD PERSISTENT ASTHMA WITHOUT COMPLICATION: ICD-10-CM

## 2020-03-17 DIAGNOSIS — E66.01 MORBID OBESITY (H): ICD-10-CM

## 2020-03-17 NOTE — TELEPHONE ENCOUNTER
E-visit instructions given to Jennifer to further discuss medication refill.     Myron Alicia RN, BSN, PHN

## 2020-03-17 NOTE — TELEPHONE ENCOUNTER
"Requested Prescriptions   Pending Prescriptions Disp Refills     albuterol (PROAIR HFA/PROVENTIL HFA/VENTOLIN HFA) 108 (90 Base) MCG/ACT inhaler 8.5 g 1     Sig: Inhale 2 puffs into the lungs every 6 hours as needed for shortness of breath / dyspnea       Asthma Maintenance Inhalers - Anticholinergics Failed - 3/17/2020 10:50 AM        Failed - Asthma control assessment score within normal limits in last 6 months     Please review ACT score.           Passed - Patient is age 12 years or older        Passed - Medication is active on med list        Passed - Recent (6 mo) or future (30 days) visit within the authorizing provider's specialty     Patient had office visit in the last 6 months or has a visit in the next 30 days with authorizing provider or within the authorizing provider's specialty.  See \"Patient Info\" tab in inbasket, or \"Choose Columns\" in Meds & Orders section of the refill encounter.           Short-Acting Beta Agonist Inhalers Protocol  Failed - 3/17/2020 10:50 AM        Failed - Asthma control assessment score within normal limits in last 6 months     Please review ACT score.           Passed - Patient is age 12 or older        Passed - Medication is active on med list        Passed - Recent (6 mo) or future (30 days) visit within the authorizing provider's specialty     Patient had office visit in the last 6 months or has a visit in the next 30 days with authorizing provider or within the authorizing provider's specialty.  See \"Patient Info\" tab in inbasket, or \"Choose Columns\" in Meds & Orders section of the refill encounter.               phentermine (ADIPEX-P) 30 MG capsule 30 capsule 0     Sig: Take 1 capsule (30 mg) by mouth every morning       There is no refill protocol information for this order      Last Written Prescription Date:  1/6/2020  Last Fill Quantity: 30,  # refills: 0   Last office visit: 1/6/2020 with prescribing provider:  Toya  Future Office Visit:        "

## 2020-03-17 NOTE — TELEPHONE ENCOUNTER
"Requested Prescriptions   Pending Prescriptions Disp Refills     albuterol (PROAIR HFA/PROVENTIL HFA/VENTOLIN HFA) 108 (90 Base) MCG/ACT inhaler 8.5 g 1     Sig: Inhale 2 puffs into the lungs every 6 hours as needed for shortness of breath / dyspnea       Asthma Maintenance Inhalers - Anticholinergics Failed - 3/17/2020 11:27 AM        Failed - Asthma control assessment score within normal limits in last 6 months     Please review ACT score.           Passed - Patient is age 12 years or older        Passed - Medication is active on med list        Passed - Recent (6 mo) or future (30 days) visit within the authorizing provider's specialty     Patient had office visit in the last 6 months or has a visit in the next 30 days with authorizing provider or within the authorizing provider's specialty.  See \"Patient Info\" tab in inbasket, or \"Choose Columns\" in Meds & Orders section of the refill encounter.           Short-Acting Beta Agonist Inhalers Protocol  Failed - 3/17/2020 11:27 AM        Failed - Asthma control assessment score within normal limits in last 6 months     Please review ACT score.           Passed - Patient is age 12 or older        Passed - Medication is active on med list        Passed - Recent (6 mo) or future (30 days) visit within the authorizing provider's specialty     Patient had office visit in the last 6 months or has a visit in the next 30 days with authorizing provider or within the authorizing provider's specialty.  See \"Patient Info\" tab in inbasket, or \"Choose Columns\" in Meds & Orders section of the refill encounter.               phentermine (ADIPEX-P) 30 MG capsule 30 capsule 0     Sig: Take 1 capsule (30 mg) by mouth every morning       There is no refill protocol information for this order        Last Written Prescription Date:  5/22/2019  Last Fill Quantity: 8.5 g,  # refills: 1   Last office visit: 1/6/2020 with prescribing provider:  Toya   Future Office Visit:      "

## 2020-03-18 RX ORDER — ALBUTEROL SULFATE 90 UG/1
2 AEROSOL, METERED RESPIRATORY (INHALATION) EVERY 6 HOURS PRN
Qty: 8.5 G | Refills: 1 | Status: SHIPPED | OUTPATIENT
Start: 2020-03-18 | End: 2020-10-28

## 2020-03-18 RX ORDER — PHENTERMINE HYDROCHLORIDE 30 MG/1
30 CAPSULE ORAL EVERY MORNING
Qty: 30 CAPSULE | Refills: 0 | OUTPATIENT
Start: 2020-03-18

## 2020-06-03 ENCOUNTER — VIRTUAL VISIT (OUTPATIENT)
Dept: FAMILY MEDICINE | Facility: CLINIC | Age: 45
End: 2020-06-03
Payer: MEDICAID

## 2020-06-03 VITALS — SYSTOLIC BLOOD PRESSURE: 130 MMHG | WEIGHT: 178 LBS | BODY MASS INDEX: 31.04 KG/M2 | DIASTOLIC BLOOD PRESSURE: 80 MMHG

## 2020-06-03 DIAGNOSIS — E66.01 MORBID OBESITY (H): ICD-10-CM

## 2020-06-03 PROCEDURE — 99213 OFFICE O/P EST LOW 20 MIN: CPT | Mod: 95 | Performed by: FAMILY MEDICINE

## 2020-06-03 RX ORDER — PHENTERMINE HYDROCHLORIDE 30 MG/1
30 CAPSULE ORAL EVERY MORNING
Qty: 30 CAPSULE | Refills: 0 | Status: SHIPPED | OUTPATIENT
Start: 2020-06-03 | End: 2022-04-06

## 2020-06-03 NOTE — PROGRESS NOTES
"Jennifer Gonsalez is a 44 year old female who is being evaluated via a billable telephone visit.      The patient has been notified of following:     \"This telephone visit will be conducted via a call between you and your physician/provider. We have found that certain health care needs can be provided without the need for a physical exam.  This service lets us provide the care you need with a short phone conversation.  If a prescription is necessary we can send it directly to your pharmacy.  If lab work is needed we can place an order for that and you can then stop by our lab to have the test done at a later time.    Telephone visits are billed at different rates depending on your insurance coverage. During this emergency period, for some insurers they may be billed the same as an in-person visit.  Please reach out to your insurance provider with any questions.    If during the course of the call the physician/provider feels a telephone visit is not appropriate, you will not be charged for this service.\"    Patient has given verbal consent for Telephone visit?  Yes    What phone number would you like to be contacted at? [phone]    How would you like to obtain your AVS? MyChart    Subjective     Jennifer Gonsalez is a 44 year old female who presents via phone visit today for the following health issues:    HPI     Medication Followup of Phentermine    Taking Medication as prescribed: yes    Side Effects:  None    Medication Helping Symptoms:  Yes    Taking 64 oz of water per day at least        Reviewed and updated as needed this visit by Provider  Tobacco  Allergies  Meds  Problems  Med Hx  Surg Hx  Fam Hx         Review of Systems   Constitutional, HEENT, cardiovascular, pulmonary, gi and gu systems are negative, except as otherwise noted.       Objective   Reported vitals:  /80   Wt 80.7 kg (178 lb)   LMP 06/22/2019 (Approximate)   BMI 31.04 kg/m     healthy, alert and no distress  PSYCH: Alert and " oriented times 3; coherent speech, normal   rate and volume, able to articulate logical thoughts, able   to abstract reason, no tangential thoughts, no hallucinations   or delusions  Her affect is normal  RESP: No cough, no audible wheezing, able to talk in full sentences  Remainder of exam unable to be completed due to telephone visits    Diagnostic Test Results:  Labs reviewed in Epic        Assessment/Plan:  1. Morbid obesity (H)  Improved and would like a refill to continue progress  - phentermine (ADIPEX-P) 30 MG capsule; Take 1 capsule (30 mg) by mouth every morning  Dispense: 30 capsule; Refill: 0    The uses and side effects, including black box warnings as appropriate, were discussed in detail.  All patient questions were answered.  The patient was instructed to call immediately if any side effects developed.     Return in about 4 weeks (around 7/1/2020).      Phone call duration:  9 minutes    Myla Obrien MD

## 2020-06-03 NOTE — PATIENT INSTRUCTIONS
At [facility], we strive to deliver an exceptional experience to you, every time we see you. If you receive a survey, please complete it as we do value your feedback.  If you have MyChart, you can expect to receive results automatically within 24 hours of their completion.  Your provider will send a note interpreting your results as well.   If you do not have MyChart, you should receive your results in about a week by mail.    Your care team:                            Family Medicine Internal Medicine   MD Nathan Leslie MD Shantel Branch-Fleming, MD Katya Georgiev PA-C Megan Hill, APRLINH Escobar, MD Pediatrics   Alfred Howell, PARODRI Oliva, MD Erin Mark APRN CNP   MD Nano Rock MD Deborah Mielke, MD Kim Thein, APRN [facility]      Clinic hours: Monday - Thursday 7 am-7 pm; Fridays 7 am-5 pm.   Urgent care: Monday - Friday 11 am-9 pm; Saturday and Sunday 9 am-5 pm.    Clinic: [phone]       [facility]: Monday - Thursday 8 am - 7 pm; Friday 8 am - 6 pm  [facility]: [phone]     Use [email or web address] for 24/7 diagnosis and treatment of dozens of conditions.

## 2020-10-15 ENCOUNTER — TELEPHONE (OUTPATIENT)
Dept: FAMILY MEDICINE | Facility: CLINIC | Age: 45
End: 2020-10-15

## 2020-10-15 NOTE — TELEPHONE ENCOUNTER
Panel Management Review   One phone call and send letter if unable to reach them or Consumer [employer] message and send letter if not read after 2 weeks (You will get a message to your inbasket)      6/3/2020    Health Maintenance Due   Topic Date Due     Pneumococcal Vaccine: Pediatrics (0 to 5 Years) and At-Risk Patients (6 to 64 Years) (1 of 1 - PPSV23) 09/11/1981     ASTHMA ACTION PLAN  12/07/2017     BMP  11/22/2019     ASTHMA CONTROL TEST  11/22/2019     PHQ-2  01/01/2020     PREVENTIVE CARE VISIT  05/22/2020     INFLUENZA VACCINE (1) 09/01/2020     LIPID  09/11/2020        Fail List measure: Asthma        Patient is due/failing the following:   ACT    Action needed:   Patient needs to do ACT.    Type of outreach:    Sent Consumer [employer] message.    Questions for provider review:    None                                                                                       Chart routed to none .                                            Myla Obrien MD

## 2020-10-28 ENCOUNTER — OFFICE VISIT (OUTPATIENT)
Dept: FAMILY MEDICINE | Facility: CLINIC | Age: 45
End: 2020-10-28

## 2020-10-28 VITALS
TEMPERATURE: 98 F | OXYGEN SATURATION: 100 % | HEIGHT: 64 IN | DIASTOLIC BLOOD PRESSURE: 94 MMHG | BODY MASS INDEX: 32.61 KG/M2 | SYSTOLIC BLOOD PRESSURE: 134 MMHG | RESPIRATION RATE: 18 BRPM | HEART RATE: 73 BPM | WEIGHT: 191 LBS

## 2020-10-28 DIAGNOSIS — J45.30 MILD PERSISTENT ASTHMA WITHOUT COMPLICATION: ICD-10-CM

## 2020-10-28 DIAGNOSIS — G44.319 ACUTE POST-TRAUMATIC HEADACHE, NOT INTRACTABLE: Primary | ICD-10-CM

## 2020-10-28 DIAGNOSIS — I10 HYPERTENSION GOAL BP (BLOOD PRESSURE) < 140/90: ICD-10-CM

## 2020-10-28 DIAGNOSIS — Z13.6 CARDIOVASCULAR SCREENING; LDL GOAL LESS THAN 160: ICD-10-CM
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
## 2020-10-28 PROBLEM — E66.811 CLASS 1 OBESITY DUE TO EXCESS CALORIES WITH BODY MASS INDEX (BMI) OF 33.0 TO 33.9 IN ADULT: Status: ACTIVE | Noted: 2019-05-22

## 2020-10-28 PROBLEM — E66.09 CLASS 1 OBESITY DUE TO EXCESS CALORIES WITH BODY MASS INDEX (BMI) OF 33.0 TO 33.9 IN ADULT: Status: ACTIVE | Noted: 2019-05-22

## 2020-10-28 PROBLEM — E66.01 MORBID OBESITY (H): Status: RESOLVED | Noted: 2019-07-17 | Resolved: 2020-10-28

## 2020-10-28 PROBLEM — G44.309 POST-TRAUMATIC HEADACHE: Status: ACTIVE | Noted: 2020-10-28

## 2020-10-28 LAB
CREAT UR-MCNC: 180 MG/DL
MICROALBUMIN UR-MCNC: 14 MG/L
MICROALBUMIN/CREAT UR: 7.72 MG/G CR (ref 0–25)

## 2020-10-28 PROCEDURE — 80048 BASIC METABOLIC PNL TOTAL CA: CPT | Performed by: NURSE PRACTITIONER

## 2020-10-28 PROCEDURE — 36415 COLL VENOUS BLD VENIPUNCTURE: CPT | Performed by: NURSE PRACTITIONER

## 2020-10-28 PROCEDURE — 82043 UR ALBUMIN QUANTITATIVE: CPT | Performed by: NURSE PRACTITIONER

## 2020-10-28 PROCEDURE — 99214 OFFICE O/P EST MOD 30 MIN: CPT | Performed by: NURSE PRACTITIONER

## 2020-10-28 RX ORDER — PHENTERMINE HYDROCHLORIDE 30 MG/1
30 CAPSULE ORAL EVERY MORNING
Qty: 30 CAPSULE | Refills: 0 | Status: CANCELLED | OUTPATIENT
Start: 2020-10-28

## 2020-10-28 RX ORDER — ALBUTEROL SULFATE 0.83 MG/ML
2.5 SOLUTION RESPIRATORY (INHALATION) EVERY 6 HOURS PRN
Qty: 60 VIAL | Refills: 6 | Status: SHIPPED | OUTPATIENT
Start: 2020-10-28 | End: 2022-05-30

## 2020-10-28 RX ORDER — ALBUTEROL SULFATE 90 UG/1
2 AEROSOL, METERED RESPIRATORY (INHALATION) EVERY 6 HOURS PRN
Qty: 8.5 G | Refills: 1 | Status: SHIPPED | OUTPATIENT
Start: 2020-10-28 | End: 2021-05-09

## 2020-10-28 ASSESSMENT — PAIN SCALES - GENERAL: PAINLEVEL: MODERATE PAIN (5)

## 2020-10-28 ASSESSMENT — MIFFLIN-ST. JEOR: SCORE: 1488.43

## 2020-10-28 NOTE — LETTER
My Asthma Action Plan    Name: Jennifer Gonsalez   YOB: 1975  Date: 10/28/2020   My doctor: JOSUE Stokes CNP   My clinic: [facility]        My Rescue Medicine:   Albuterol inhaler (Proair/Ventolin/Proventil HFA)  2-4 puffs EVERY 4 HOURS as needed. Use a spacer if recommended by your provider.   My Asthma Severity:   Intermittent / Exercise Induced  Know your asthma triggers: pollens  strong odors and fumes          GREEN ZONE   Good Control    I feel good    No cough or wheeze    Can work, sleep and play without asthma symptoms       Take your asthma control medicine every day.     1. If exercise triggers your asthma, take your rescue medication    15 minutes before exercise or sports, and    During exercise if you have asthma symptoms  2. Spacer to use with inhaler: If you have a spacer, make sure to use it with your inhaler             YELLOW ZONE Getting Worse  I have ANY of these:    I do not feel good    Cough or wheeze    Chest feels tight    Wake up at night   1. Keep taking your Green Zone medications  2. Start taking your rescue medicine:    every 20 minutes for up to 1 hour. Then every 4 hours for 24-48 hours.  3. If you stay in the Yellow Zone for more than 12-24 hours, contact your doctor.  4. If you do not return to the Green Zone in 12-24 hours or you get worse, start taking your oral steroid medicine if prescribed by your provider.           RED ZONE Medical Alert - Get Help  I have ANY of these:    I feel awful    Medicine is not helping    Breathing getting harder    Trouble walking or talking    Nose opens wide to breathe       1. Take your rescue medicine NOW  2. If your provider has prescribed an oral steroid medicine, start taking it NOW  3. Call your doctor NOW  4. If you are still in the Red Zone after 20 minutes and you have not reached your doctor:    Take your rescue medicine again and    Call 911 or go to the emergency room right  away    See your regular doctor within 2 weeks of an Emergency Room or Urgent Care visit for follow-up treatment.          Annual Reminders:  Meet with Asthma Educator,  Flu Shot in the Fall, consider Pneumonia Vaccination for patients with asthma (aged 19 and older).    Pharmacy:    [facility] - [place], [place] - [address], [address]  [id number] - [place], [place] - [address] [facility] [place]    Electronically signed by JOSUE Stokes CNP   Date: 10/28/20                    Asthma Triggers  How To Control Things That Make Your Asthma Worse    Triggers are things that make your asthma worse.  Look at the list below to help you find your triggers and   what you can do about them. You can help prevent asthma flare-ups by staying away from your triggers.      Trigger                                                          What you can do   Cigarette Smoke  Tobacco smoke can make asthma worse. Do not allow smoking in your home, car or around you.  Be sure no one smokes at a child s day care or school.  If you smoke, ask your health care provider for ways to help you quit.  Ask family members to quit too.  Ask your health care provider for a referral to Quit Plan to help you quit smoking, or call 5-627-052-PLAN.     Colds, Flu, Bronchitis  These are common triggers of asthma. Wash your hands often.  Don t touch your eyes, nose or mouth.  Get a flu shot every year.     Dust Mites  These are tiny bugs that live in cloth or carpet. They are too small to see. Wash sheets and blankets in hot water every week.   Encase pillows and mattress in dust mite proof covers.  Avoid having carpet if you can. If you have carpet, vacuum weekly.   Use a dust mask and HEPA vacuum.   Pollen and Outdoor Mold  Some people are allergic to trees, grass, or weed pollen, or molds. Try to keep your windows closed.  Limit time out doors when pollen count is high.   Ask you  health care provider about taking medicine during allergy season.     Animal Dander  Some people are allergic to skin flakes, urine or saliva from pets with fur or feathers. Keep pets with fur or feathers out of your home.    If you can t keep the pet outdoors, then keep the pet out of your bedroom.  Keep the bedroom door closed.  Keep pets off cloth furniture and away from stuffed toys.     Mice, Rats, and Cockroaches  Some people are allergic to the waste from these pests.   Cover food and garbage.  Clean up spills and food crumbs.  Store grease in the refrigerator.   Keep food out of the bedroom.   Indoor Mold  This can be a trigger if your home has high moisture. Fix leaking faucets, pipes, or other sources of water.   Clean moldy surfaces.  Dehumidify basement if it is damp and smelly.   Smoke, Strong Odors, and Sprays  These can reduce air quality. Stay away from strong odors and sprays, such as perfume, powder, hair spray, paints, smoke incense, paint, cleaning products, candles and new carpet.   Exercise or Sports  Some people with asthma have this trigger. Be active!  Ask your doctor about taking medicine before sports or exercise to prevent symptoms.    Warm up for 5-10 minutes before and after sports or exercise.     Other Triggers of Asthma  Cold air:  Cover your nose and mouth with a scarf.  Sometimes laughing or crying can be a trigger.  Some medicines and food can trigger asthma.
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
## 2020-10-28 NOTE — PATIENT INSTRUCTIONS
Patient Education     After a Concussion     Awaken to check alertness as often as the health care provider suggests.     If you had a mild concussion (a head injury), watch closely for signs of problems during the first 48 hours after the injury. Follow the doctor s advice about recovering at home. Use the tips on this handout as a guide.  Note: You should not be left alone after a concussion. If no adult can stay with the injured person, let the doctor know.  Have someone call 911 or your emergency number if you can't fully wake up or have a seizures or convulsions.  The first 48 hours  Don t take medicine unless approved by your healthcare provider. Try placing a cold, damp cloth on your head to help relieve a headache.    Ask the doctor before using any medicines.    Don't drink alcohol or take sedatives or medicines that make you sleepy.    Don't return to sports or any activity that could cause you to hit your head until all symptoms are gone and you have been cleared by your doctor. A second head injury before fully recovering from the first one can lead to serious brain injury.    Don't do activities that need a lot of concentration or a lot of attention. This will allow your brain to rest and heal more quickly.    Return to regular physical and mental activity as directed and approved by your healthcare provider.  Tips about sleeping  For the first day or two, it may be best not to sleep for long periods of time without being checked for alertness. Follow the doctor s instructions.  ? Have someone wake you every ____ hours for the next ____ hours. He or she should ask you questions to check for alertness.  ? OK to sleep through the night.  When to call the healthcare provider  If you notice any of the following, call the healthcare provider:    Vomiting. Some vomiting is common, but tell the provider about any vomiting.    Clear or bloody drainage from the nose or ear    Constant drowsiness or difficulty  in waking up    Confusion or memory loss    Blurred vision or any vision changes    Inability to walk or talk normally    Increased weakness or problems with coordination    Constant, unrelieved headache that becomes more severe    Changes in behavior or personality    High-pitched crying in infants    Signs of stroke such as paralysis of parts of the body    Uncrontrolled movements suggesting a seizure  Date Last Reviewed: 12/1/2017 2000-2019 [employer]. [address] [place] [address]. All rights reserved. This information is not intended as a substitute for professional medical care. Always follow your healthcare professional's instructions.

## 2020-10-29 LAB
ANION GAP SERPL CALCULATED.3IONS-SCNC: 4 MMOL/L (ref 3–14)
BUN SERPL-MCNC: 12 MG/DL (ref 7–30)
CALCIUM SERPL-MCNC: 9 MG/DL (ref 8.5–10.1)
CHLORIDE SERPL-SCNC: 108 MMOL/L (ref 94–109)
CO2 SERPL-SCNC: 29 MMOL/L (ref 20–32)
CREAT SERPL-MCNC: 0.72 MG/DL (ref 0.52–1.04)
GFR SERPL CREATININE-BSD FRML MDRD: >90 ML/MIN/{1.73_M2}
GLUCOSE SERPL-MCNC: 76 MG/DL (ref 70–99)
POTASSIUM SERPL-SCNC: 4 MMOL/L (ref 3.4–5.3)
SODIUM SERPL-SCNC: 141 MMOL/L (ref 133–144)

## 2020-10-29 ASSESSMENT — ASTHMA QUESTIONNAIRES: ACT_TOTALSCORE: 25

## 2020-11-04 ENCOUNTER — TELEPHONE (OUTPATIENT)
Dept: FAMILY MEDICINE | Facility: CLINIC | Age: 45
End: 2020-11-04

## 2020-11-04 NOTE — TELEPHONE ENCOUNTER
She can take XS Tylenol 1000 mg (OTC) every 8 hours, max dose 6 tablets/day for pain.  She has not yet scheduled with the concussion clinic.  She should call  [phone] [facility], [address] [place] [address] to get scheduled.    She needs to continue to avoid fast moving screen time, limit screen time to < 2 hours/day, rest, stay well hydrated, avoid alcohol.    Sammie SAALS, CNP

## 2020-11-04 NOTE — TELEPHONE ENCOUNTER
Called and spoke with patient. Relayed message from provider, as noted below.  Patient agreed and was understanding.  Patient noted she does have a visit scheduled with concussion clinic on 11/16/20. This was earliest available, will be a virtual visit.   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 Wears glasses. Left work early today because of headache.  Reviewed home therapies to help with concussion symptoms, as noted by provider.  No further questions at this time from patient.    Alexandria Kelly RN  [facility]

## 2020-11-04 NOTE — TELEPHONE ENCOUNTER
Patient was seen in the last 7 days. Provider to address request for pain medications.    Jing Johnson RN, [facility] Triage

## 2020-11-16 ENCOUNTER — TRANSFERRED RECORDS (OUTPATIENT)
Dept: HEALTH INFORMATION MANAGEMENT | Facility: CLINIC | Age: 45
End: 2020-11-16

## 2020-11-16 ENCOUNTER — HOSPITAL ENCOUNTER (OUTPATIENT)
Dept: NEUROLOGY | Facility: CLINIC | Age: 45
Setting detail: THERAPIES SERIES
Discharge: STILL ADMITTED OR ADMINISTRATIVE | End: 2020-11-16
Attending: NURSE PRACTITIONER

## 2020-11-16 DIAGNOSIS — R45.4 IRRITABILITY: ICD-10-CM

## 2020-11-16 DIAGNOSIS — Z76.89 RETURN TO WORK EVALUATION: ICD-10-CM

## 2020-11-16 DIAGNOSIS — R11.0 NAUSEA: ICD-10-CM

## 2020-11-16 DIAGNOSIS — R68.89 SENSITIVITY TO LIGHT: ICD-10-CM

## 2020-11-16 DIAGNOSIS — F07.81 POST CONCUSSION SYNDROME: ICD-10-CM

## 2020-11-16 DIAGNOSIS — R41.840 ATTENTION AND CONCENTRATION DEFICIT: ICD-10-CM

## 2020-11-16 DIAGNOSIS — R42 DIZZINESS: ICD-10-CM

## 2020-11-16 DIAGNOSIS — R53.83 FATIGUE, UNSPECIFIED TYPE: ICD-10-CM

## 2020-11-16 DIAGNOSIS — R41.3 MEMORY DIFFICULTIES: ICD-10-CM

## 2020-11-16 DIAGNOSIS — F43.22 ADJUSTMENT DISORDER WITH ANXIETY: ICD-10-CM

## 2020-11-16 DIAGNOSIS — G44.309 POST-CONCUSSION HEADACHE: ICD-10-CM

## 2020-11-16 DIAGNOSIS — G47.00 INSOMNIA, UNSPECIFIED TYPE: ICD-10-CM

## 2020-11-16 DIAGNOSIS — H83.3X3 SOUND SENSITIVITY IN BOTH EARS: ICD-10-CM

## 2020-11-16 ASSESSMENT — MIFFLIN-ST. JEOR: SCORE: 1488.43

## 2020-11-18 ENCOUNTER — COMMUNICATION - HEALTHEAST (OUTPATIENT)
Dept: NEUROLOGY | Facility: CLINIC | Age: 45
End: 2020-11-18

## 2020-11-22 ENCOUNTER — HEALTH MAINTENANCE LETTER (OUTPATIENT)
Age: 45
End: 2020-11-22

## 2020-11-30 ENCOUNTER — COMMUNICATION - HEALTHEAST (OUTPATIENT)
Dept: NEUROLOGY | Facility: CLINIC | Age: 45
End: 2020-11-30

## 2020-12-01 ENCOUNTER — COMMUNICATION - HEALTHEAST (OUTPATIENT)
Dept: NEUROLOGY | Facility: CLINIC | Age: 45
End: 2020-12-01

## 2020-12-19 DIAGNOSIS — E66.01 MORBID OBESITY (H): ICD-10-CM

## 2020-12-19 RX ORDER — PHENTERMINE HYDROCHLORIDE 30 MG/1
CAPSULE ORAL
Qty: 30 CAPSULE | Status: CANCELLED | OUTPATIENT
Start: 2020-12-19

## 2020-12-21 RX ORDER — PHENTERMINE HYDROCHLORIDE 30 MG/1
CAPSULE ORAL
Qty: 30 CAPSULE | OUTPATIENT
Start: 2020-12-21

## 2020-12-21 NOTE — TELEPHONE ENCOUNTER
Routing refill request to provider for review/approval because:  Drug not on the [facility] refill protocol       Myron Alicia RN, BSN, PHN

## 2021-02-13 ENCOUNTER — HEALTH MAINTENANCE LETTER (OUTPATIENT)
Age: 46
End: 2021-02-13

## 2021-05-09 ENCOUNTER — OFFICE VISIT (OUTPATIENT)
Dept: URGENT CARE | Facility: URGENT CARE | Age: 46
End: 2021-05-09
Payer: COMMERCIAL

## 2021-05-09 VITALS
DIASTOLIC BLOOD PRESSURE: 98 MMHG | TEMPERATURE: 97.4 F | HEART RATE: 72 BPM | WEIGHT: 205.25 LBS | SYSTOLIC BLOOD PRESSURE: 192 MMHG | RESPIRATION RATE: 20 BRPM | OXYGEN SATURATION: 98 % | BODY MASS INDEX: 35.79 KG/M2

## 2021-05-09 DIAGNOSIS — E66.01 MORBID OBESITY (H): ICD-10-CM

## 2021-05-09 DIAGNOSIS — R51.9 FACIAL PAIN: ICD-10-CM

## 2021-05-09 DIAGNOSIS — J45.30 MILD PERSISTENT ASTHMA WITHOUT COMPLICATION: ICD-10-CM

## 2021-05-09 DIAGNOSIS — I16.0 HYPERTENSIVE URGENCY: Primary | ICD-10-CM

## 2021-05-09 DIAGNOSIS — Z72.0 TOBACCO ABUSE: ICD-10-CM

## 2021-05-09 PROCEDURE — 99215 OFFICE O/P EST HI 40 MIN: CPT | Performed by: NURSE PRACTITIONER

## 2021-05-09 RX ORDER — ALBUTEROL SULFATE 90 UG/1
2 AEROSOL, METERED RESPIRATORY (INHALATION) EVERY 6 HOURS PRN
Qty: 8.5 G | Refills: 0 | Status: SHIPPED | OUTPATIENT
Start: 2021-05-09 | End: 2022-04-06

## 2021-05-09 NOTE — PROGRESS NOTES
Assessment & Plan     Hypertensive urgency      Mild persistent asthma without complication    - albuterol (PROAIR HFA/PROVENTIL HFA/VENTOLIN HFA) 108 (90 Base) MCG/ACT inhaler  Dispense: 8.5 g; Refill: 0    Morbid obesity (H)      Facial pain      Tobacco abuse      With hypertensive urgency in addition to tobacco use, obesity, severe facial pain, recommend further evaluation now in emergency room and patient is agreeable and declines ambulance. Refilled albuterol inhaler. Patient is discharged in stable condition.       Mimi Morton NP  [facility] [place] ESE Rodriguez is a 45 year old female who presents to clinic today for the following health issues:  Chief Complaint   Patient presents with     Facial Pain     Face pain on the right side of the face that started this morning, the pain starts from her periorbital sinuses and radiates around the right side of her face, has taken Claritin, Ibuprofen and Tylenol and nothing worked     Refill Request     Would like her Albuterol Inhaler refilled     Hypertension     Elevated blood pressure     Patient presents for evaluation of facial pain which started this morning. Located on right sinuses radiating to right side of face. She has tried Claritin, ibuprofen, tylenol which hasn't worked. Pain is rated 10/10. She denies chest pain, shortness of breath, nasal congestion, cough, dizziness, blurred vision. She does use tobacco. No known facial injury. No history of HTN, MI, stroke.     She would like a refill of her albuterol inhaler that she uses for athma.     Problem list, Medication list, Allergies, and Medical history reviewed in EPIC.    ROS:  Review of systems negative except for noted above        Objective    BP (!) 192/98   Pulse 72   Temp 97.4  F (36.3  C) (Tympanic)   Resp 20   Wt 93.1 kg (205 lb 4 oz)   LMP 06/22/2019 (Approximate)   SpO2 98%   Breastfeeding No   BMI 35.79 kg/m    Physical  Exam  Constitutional:       General: She is not in acute distress.     Appearance: She is not toxic-appearing or diaphoretic.   HENT:      Head: Normocephalic and atraumatic.      Nose: No congestion.      Right Sinus: Maxillary sinus tenderness present. No frontal sinus tenderness.      Left Sinus: No maxillary sinus tenderness or frontal sinus tenderness.   Cardiovascular:      Rate and Rhythm: Normal rate and regular rhythm.      Heart sounds: Normal heart sounds.   Pulmonary:      Effort: Pulmonary effort is normal. No respiratory distress.      Breath sounds: Normal breath sounds. No wheezing, rhonchi or rales.   Neurological:      General: No focal deficit present.      Mental Status: She is alert.

## 2021-05-09 NOTE — PATIENT INSTRUCTIONS
Go to emergency room now for further evaluation of elevated blood pressure, right sided facial pain 10/10 since this morning

## 2021-06-05 VITALS — WEIGHT: 191 LBS | BODY MASS INDEX: 32.61 KG/M2 | HEIGHT: 64 IN
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
## 2021-06-13 NOTE — TELEPHONE ENCOUNTER
Jennifer called saying her Amitriptyline 25 mg and Bupropion 150 mg since her last appointment with Charlene this last Monday, 11/16/2020 hasn't been helping. She states she's still having bad headaches. She says she needs something for pain while she's sitting in front of a computer screen at work all day. She say she has no depression. She wants to understand and knowwhat this medication is specifically for.     Ulices Dolan MA

## 2021-06-13 NOTE — TELEPHONE ENCOUNTER
Pt called saying she needs letter saying she needs her work restrictions released fully to go back to work this Wednesday, December 2nd, 2020. I will e-mail it to her at [email or web address].    Ulices Dolan MA

## 2021-06-21 NOTE — LETTER
Letter by Charlene Rodriguez FNP at      Author: Charlene Rodriguez FNP Service: -- Author Type: --    Filed:  Date of Service:  Status: (Other)         November 16, 2020     Patient: Jennifer Gonsalez   YOB: 1975   Date of Visit: 11/16/2020       To Whom It May Concern:    It is my medical opinion that Jennifer Gonsalez may return to work on 11/16/2020.   Employees recovering from concussions often exhibit cognitive symptoms that make attending work and learning difficult. They may not be able to attend work or only partial days. Some symptoms that could affect performance in the work environment  include: sensitivity to light and noise, headache, trouble focusing, concentrating, or remembering, and difficulty looking at a screen. The accommodations below often help reduce the symptoms and allow them to return to work quicker. Compliance with these accommodations allows the brain to recover more quickly even if it appears the employee is symptom free.     Attendance Restrictions  Full days as tolerated.   Other Restrictions:  1. Please put a screen filter on the patient's computer   2. Allow patient to take 10 minute breaks every 60 minutes   3. Allow patient to take a break if she starts to have symptoms, If symptoms continue or worsen please allow patient to return home.  4. Allow employee to wear sunglasses and hat to help patient's sensitivity to lights. Remove any overhead lighting and replace with lamps if possible. Move patient to a office if available.  5. If patient feels overwhelmed at the office please allow patient to work from home if available  6. If patient wakes with severe headache please allow patient to start work later, it symptoms worsen patient should not attempt to work.      If you have any questions or concerns, please don't hesitate to call.    Sincerely,        Electronically signed by STEPHY Oquendo        Subjective:      Patient ID: Meka Blankenship is a 61 y.o. male. HPI patient comes in for hyperlipidemia and hypertension:    Treatment Adherence:   Medication compliance:  compliant most of the time  Diet compliance:  compliant most of the time  Weight trend: decreasing  Current exercise: walks 3 time(s) per week  Barriers: none    Hypertension:  Home blood pressure monitoring: No. Patient denies chest pain, shortness of breath and headache. Antihypertensive medication side effects: no medication side effects noted. Use of agents associated with hypertension: none. Sodium (mmol/L)   Date Value   01/17/2018 140    BUN (mg/dL)   Date Value   01/17/2018 34 (H)    Glucose (mg/dL)   Date Value   01/17/2018 117 (H)      Potassium (mmol/L)   Date Value   01/17/2018 4.4    CREATININE (mg/dL)   Date Value   01/17/2018 1.29 (H)         Hyperlipidemia:  No new myalgias or GI upset on atorvastatin (Lipitor).      Lab Results   Component Value Date    CHOL 165 01/17/2018    CHOL 108 01/17/2018    TRIG 131 01/17/2018    HDL 57 01/17/2018    LDLCALC 85 01/17/2018     Lab Results   Component Value Date    ALT 24 01/17/2018    AST 21 01/17/2018          Review of Systems   Past Medical History:   Diagnosis Date    Allergic     Arthritis     Hyperlipidemia     Hypertension     Renal insufficiency      Past Surgical History:   Procedure Laterality Date    SKIN CANCER EXCISION Left     eye lid     Family History   Problem Relation Age of Onset    Heart Disease Father     Heart Disease Brother     High Cholesterol Brother     High Blood Pressure Brother     Cancer Brother     Cancer Sister     Heart Disease Brother     High Cholesterol Brother     High Blood Pressure Brother     High Cholesterol Brother     High Blood Pressure Brother     Cancer Brother      multiple myeloma     Social History     Social History    Marital status:      Spouse name: N/A    Number of children: N/A    Years of education: N/A     Occupational History    Not on file. Social History Main Topics    Smoking status: Former Smoker     Packs/day: 1.00     Years: 7.00     Quit date: 5/31/1985    Smokeless tobacco: Not on file    Alcohol use Yes      Comment: RED WINE DAILY 8- 12 OZ    Drug use: Unknown    Sexual activity: Not on file     Other Topics Concern    Not on file     Social History Narrative    No narrative on file      Vitals:    01/29/18 1304   BP: 138/80   Pulse: 76   Temp: 97.3 °F (36.3 °C)   SpO2: 97%   Weight: 189 lb (85.7 kg)      Wt Readings from Last 3 Encounters:   01/29/18 189 lb (85.7 kg)   08/28/17 184 lb (83.5 kg)   06/15/17 186 lb 6.4 oz (84.6 kg)     BP Readings from Last 3 Encounters:   01/29/18 138/80   08/28/17 126/76   06/15/17 138/82     Body mass index is 29.16 kg/m². Facility age limit for growth percentiles is 20 years. Objective:   Physical Exam   Constitutional: He is oriented to person, place, and time. He appears well-nourished. HENT:   Right Ear: External ear normal.   Left Ear: External ear normal.   Eyes: EOM are normal. Pupils are equal, round, and reactive to light. Right eye exhibits no discharge. Left eye exhibits no discharge. Neck: Normal range of motion. Neck supple. No JVD present. No tracheal deviation present. No thyromegaly present. Cardiovascular: Normal rate, regular rhythm and normal heart sounds. No murmur heard. Pulmonary/Chest: Effort normal and breath sounds normal. No respiratory distress. He has no wheezes. He has no rales. Neurological: He is alert and oriented to person, place, and time. No cranial nerve deficit. Assessment/Plan:  Reed Smith was seen today for other. Diagnoses and all orders for this visit:    Essential hypertension  - continue blood pressure medication    Hypercholesterolemia  -  Continue lipid lowering therapy. Primary insomnia  -     zolpidem (AMBIEN) 5 MG tablet;  Take 1 tablet by mouth

## 2021-06-21 NOTE — LETTER
Letter by Charlene Rodriguez FNP at      Author: Charlene Rodriguez FNP Service: -- Author Type: --    Filed:  Encounter Date: 12/1/2020 Status: (Other)         December 1, 2020     Patient: Jennifer Gonsalez   YOB: 1975   Date of Visit: 12/1/2020       To Whom It May Concern:    It is my medical opinion that Jennifer Gonsalez may return to full duty immediately with no restrictions.    If you have any questions or concerns, please don't hesitate to call.    Sincerely,        Electronically signed by STEPHY Oquendo

## 2021-07-21 ENCOUNTER — LAB REQUISITION (OUTPATIENT)
Dept: LAB | Facility: CLINIC | Age: 46
End: 2021-07-21

## 2021-07-21 PROCEDURE — 86481 TB AG RESPONSE T-CELL SUSP: CPT | Performed by: INTERNAL MEDICINE

## 2021-07-21 PROCEDURE — 86762 RUBELLA ANTIBODY: CPT | Performed by: INTERNAL MEDICINE

## 2021-07-21 PROCEDURE — 86787 VARICELLA-ZOSTER ANTIBODY: CPT | Performed by: INTERNAL MEDICINE

## 2021-07-21 PROCEDURE — 86735 MUMPS ANTIBODY: CPT | Performed by: INTERNAL MEDICINE

## 2021-07-21 PROCEDURE — 86765 RUBEOLA ANTIBODY: CPT | Performed by: INTERNAL MEDICINE

## 2021-07-21 PROCEDURE — 86706 HEP B SURFACE ANTIBODY: CPT | Performed by: INTERNAL MEDICINE

## 2021-07-22 LAB
HBV SURFACE AB SERPL IA-ACNC: 0 M[IU]/ML
MEV IGG SER IA-ACNC: >300 AU/ML
MEV IGG SER IA-ACNC: POSITIVE
MUMPS ANTIBODY IGG INSTRUMENT VALUE: 11.8 AU/ML
MUV IGG SER QL IA: POSITIVE
RUBV IGG SERPL QL IA: 2.98 INDEX
RUBV IGG SERPL QL IA: POSITIVE
VZV IGG SER QL IA: 2967 INDEX
VZV IGG SER QL IA: POSITIVE

## 2021-07-23 LAB
QUANTIFERON MITOGEN: 10 IU/ML
QUANTIFERON NIL TUBE: 0.04 IU/ML
QUANTIFERON TB1 TUBE: 0.05 IU/ML
QUANTIFERON TB2 TUBE: 0.05

## 2021-07-24 LAB
GAMMA INTERFERON BACKGROUND BLD IA-ACNC: 0.04 IU/ML
M TB IFN-G BLD-IMP: NEGATIVE
M TB IFN-G CD4+ BCKGRND COR BLD-ACNC: 9.96 IU/ML
MITOGEN IGNF BCKGRD COR BLD-ACNC: 0.01 IU/ML
MITOGEN IGNF BCKGRD COR BLD-ACNC: 0.01 IU/ML

## 2021-09-19 ENCOUNTER — HEALTH MAINTENANCE LETTER (OUTPATIENT)
Age: 46
End: 2021-09-19

## 2021-10-19 ENCOUNTER — VIRTUAL VISIT (OUTPATIENT)
Dept: URGENT CARE | Facility: CLINIC | Age: 46
End: 2021-10-19
Payer: COMMERCIAL

## 2021-10-19 DIAGNOSIS — R05.9 COUGH: Primary | ICD-10-CM

## 2021-10-19 PROCEDURE — 99213 OFFICE O/P EST LOW 20 MIN: CPT | Mod: 95 | Performed by: EMERGENCY MEDICINE

## 2021-10-19 NOTE — PROGRESS NOTES
Phone appointment:    Assessment: 2-day history of URI type symptoms.  No obvious Covid exposure.  No severe associated symptoms.    Plan: Covid PCR ordered.  Testing team to follow.      CHIEF COMPLAINT: URI symptoms.      HPI: Patient is a 46-year-old female who developed symptoms yesterday of sore throat, body aches, and cough.  No fever or shortness of breath.  No obvious Covid exposure.      ROS: See HPI otherwise normal.    Allergies   Allergen Reactions     Morphine Anxiety     Lactase Diarrhea     Penicillins      Diclofenac Rash      Current Outpatient Medications   Medication Sig Dispense Refill     albuterol (PROAIR HFA/PROVENTIL HFA/VENTOLIN HFA) 108 (90 Base) MCG/ACT inhaler Inhale 2 puffs into the lungs every 6 hours as needed for shortness of breath / dyspnea 8.5 g 0     albuterol (PROVENTIL) (2.5 MG/3ML) 0.083% neb solution Take 1 vial (2.5 mg) by nebulization every 6 hours as needed for shortness of breath / dyspnea (Patient not taking: Reported on 5/9/2021) 60 vial 6     order for DME Equipment being ordered: Blood pressure monitor. (Patient not taking: Reported on 5/9/2021) 1 Device 0     phentermine (ADIPEX-P) 30 MG capsule Take 1 capsule (30 mg) by mouth every morning (Patient not taking: Reported on 10/28/2020) 30 capsule 0     SM SLEEP AID 25 MG TABS tablet            PE: No acute distress on phone appointment.  Alert.  Nondyspneic sounding.        TREATMENT: None.      ASSESSMENT: URI symptoms without severe associated symptoms.  Must rule out Covid.      DIAGNOSIS: URI.      PLAN: Covid PCR ordered.  Testing team to follow.  Symptomatic medications and treatment discussed.    Time: 10 minutes

## 2021-10-20 ENCOUNTER — LAB (OUTPATIENT)
Dept: URGENT CARE | Facility: URGENT CARE | Age: 46
End: 2021-10-20
Attending: EMERGENCY MEDICINE
Payer: COMMERCIAL

## 2021-10-20 DIAGNOSIS — R05.9 COUGH: ICD-10-CM

## 2021-10-20 PROCEDURE — U0005 INFEC AGEN DETEC AMPLI PROBE: HCPCS

## 2021-10-20 PROCEDURE — U0003 INFECTIOUS AGENT DETECTION BY NUCLEIC ACID (DNA OR RNA); SEVERE ACUTE RESPIRATORY SYNDROME CORONAVIRUS 2 (SARS-COV-2) (CORONAVIRUS DISEASE [COVID-19]), AMPLIFIED PROBE TECHNIQUE, MAKING USE OF HIGH THROUGHPUT TECHNOLOGIES AS DESCRIBED BY CMS-2020-01-R: HCPCS

## 2021-10-20 PROCEDURE — 99207 PR NO CHARGE LOS: CPT

## 2021-10-21 ENCOUNTER — APPOINTMENT (OUTPATIENT)
Dept: URGENT CARE | Facility: CLINIC | Age: 46
End: 2021-10-21
Payer: COMMERCIAL

## 2021-10-21 ENCOUNTER — TELEPHONE (OUTPATIENT)
Dept: FAMILY MEDICINE | Facility: CLINIC | Age: 46
End: 2021-10-21

## 2021-10-21 LAB — SARS-COV-2 RNA RESP QL NAA+PROBE: NEGATIVE

## 2021-10-21 NOTE — TELEPHONE ENCOUNTER
Please have her see her primary provider or be seen in urgent care.     Oscar Dalton PA-C     Called and informed patient of provider message.Autumn Wang MA/TC

## 2021-10-21 NOTE — TELEPHONE ENCOUNTER
Patient had a virtual visit on 10/19/21. She still is having a cough and sore throat. She also is allergic to her sister's dog. She is wondering if she can get any medication for this.Autumn Huizar

## 2022-01-09 ENCOUNTER — HEALTH MAINTENANCE LETTER (OUTPATIENT)
Age: 47
End: 2022-01-09

## 2022-02-07 ENCOUNTER — MYC REFILL (OUTPATIENT)
Dept: FAMILY MEDICINE | Facility: CLINIC | Age: 47
End: 2022-02-07
Payer: COMMERCIAL

## 2022-02-07 ENCOUNTER — MYC MEDICAL ADVICE (OUTPATIENT)
Dept: FAMILY MEDICINE | Facility: CLINIC | Age: 47
End: 2022-02-07
Payer: COMMERCIAL

## 2022-02-07 DIAGNOSIS — E66.01 MORBID OBESITY (H): ICD-10-CM

## 2022-02-08 RX ORDER — PHENTERMINE HYDROCHLORIDE 30 MG/1
30 CAPSULE ORAL EVERY MORNING
Qty: 30 CAPSULE | Refills: 0 | OUTPATIENT
Start: 2022-02-08

## 2022-02-08 NOTE — TELEPHONE ENCOUNTER
Pt requesting to restart taking Ambien because she is not sleeping well.  Pt was last prescribed medication in 2016.    Pt is also looking to get medication for anxiety.    Advised to make visit with provider.    Susie Peacock RN  [facility]

## 2022-03-06 ENCOUNTER — HEALTH MAINTENANCE LETTER (OUTPATIENT)
Age: 47
End: 2022-03-06

## 2022-04-06 ENCOUNTER — OFFICE VISIT (OUTPATIENT)
Dept: URGENT CARE | Facility: URGENT CARE | Age: 47
End: 2022-04-06
Payer: COMMERCIAL

## 2022-04-06 VITALS
SYSTOLIC BLOOD PRESSURE: 148 MMHG | OXYGEN SATURATION: 100 % | RESPIRATION RATE: 18 BRPM | DIASTOLIC BLOOD PRESSURE: 89 MMHG | TEMPERATURE: 98.4 F | HEART RATE: 78 BPM

## 2022-04-06 DIAGNOSIS — J45.30 MILD PERSISTENT ASTHMA WITHOUT COMPLICATION: ICD-10-CM

## 2022-04-06 DIAGNOSIS — I10 HYPERTENSION GOAL BP (BLOOD PRESSURE) < 140/90: ICD-10-CM

## 2022-04-06 DIAGNOSIS — J32.9 BACTERIAL SINUSITIS: Primary | ICD-10-CM

## 2022-04-06 DIAGNOSIS — B96.89 BACTERIAL SINUSITIS: Primary | ICD-10-CM

## 2022-04-06 PROCEDURE — 99214 OFFICE O/P EST MOD 30 MIN: CPT | Performed by: NURSE PRACTITIONER

## 2022-04-06 RX ORDER — LISINOPRIL 10 MG/1
10 TABLET ORAL DAILY
Qty: 30 TABLET | Refills: 1 | Status: SHIPPED | OUTPATIENT
Start: 2022-04-06 | End: 2022-06-03

## 2022-04-06 RX ORDER — DOXYCYCLINE 100 MG/1
100 CAPSULE ORAL 2 TIMES DAILY
Qty: 14 CAPSULE | Refills: 0 | Status: SHIPPED | OUTPATIENT
Start: 2022-04-06 | End: 2022-04-13

## 2022-04-06 RX ORDER — ALBUTEROL SULFATE 90 UG/1
2 AEROSOL, METERED RESPIRATORY (INHALATION) EVERY 6 HOURS PRN
Qty: 8.5 G | Refills: 0 | Status: SHIPPED | OUTPATIENT
Start: 2022-04-06 | End: 2022-05-02

## 2022-04-06 ASSESSMENT — PAIN SCALES - GENERAL: PAINLEVEL: NO PAIN (0)

## 2022-04-06 NOTE — PATIENT INSTRUCTIONS
Home care for sinusitis includes; if symptoms not improving in 3 day start antibiotic take daily daily with food as this may cause stomach upset. Please take with a probiotic (not directly with) two hours prior or after, to protect good bacteria in colon.   Humidifier in room if available. May use flonase daily to help decrease swelling and dry up drainage. Recommend saline lavage to help remove mucous and moisturize sinuses.     Please follow up in clinic, with your primary care provider if symptoms not improving with treatment.

## 2022-04-06 NOTE — PROGRESS NOTES
Assessment & Plan      Diagnosis Comments   1. Bacterial sinusitis  doxycycline hyclate (VIBRAMYCIN) 100 MG capsule    2. Mild persistent asthma without complication  albuterol (PROAIR HFA/PROVENTIL HFA/VENTOLIN HFA) 108 (90 Base) MCG/ACT inhaler    3. Hypertension goal BP (blood pressure) < 140/90  lisinopril (ZESTRIL) 10 MG tablet        Home care instructions reviewed with patient verbalized understanding refill given for her blood pressure medication she will follow-up with her primary care provider for blood pressure issues.  She presented with hypertension today.  However has been out of her medication for some months.    Patient Instructions   Home care for sinusitis includes; if symptoms not improving in 3 day start antibiotic take daily daily with food as this may cause stomach upset. Please take with a probiotic (not directly with) two hours prior or after, to protect good bacteria in colon.   Humidifier in room if available. May use flonase daily to help decrease swelling and dry up drainage. Recommend saline lavage to help remove mucous and moisturize sinuses.     Please follow up in clinic, with your primary care provider if symptoms not improving with treatment.                     JOSUE Hunt [facility]    Cristhian Rodriguez is a 46 year old female who presents to clinic today for the following health issues:  Chief Complaint   Patient presents with     Breathing Problem     Patient had a pineapple/cucumber smoothie on zaida 4/3- her kids are allergic to pineapple but she didn't think she was- ever since then she has been having shortness of breath. Patient also has asthma.      HPI    Patient states she has had asthma exacerbation past 3 days she feels she is somewhat wheezy, dyspnea she is using her albuterol inhaler and nebulizer this has been somewhat helpful.     She did have 25 mg of benedyl this was helpful.     Blood pressure is also up  today.  She states that she has been out of her blood pressure medication does not recall the name of this.  She is due for follow-up with her primary care provider I will give her refills for this medication and she will follow-up with her primary care provider.  She denies chest pain has a headache and denies vision changes.      URI Adult    Onset of symptoms was 3 day(s) ago.  Course of illness is waxing and waning.    Severity moderate  Current and Associated symptoms: runny nose, stuffy nose, wheezing, shortness of breath and sore throat  Treatment measures tried include Nebulizer (name: albuterol).  Predisposing factors include None.        Review of Systems  Constitutional, HEENT, cardiovascular, pulmonary, gi and gu systems are negative, except as otherwise noted.      Objective    BP (!) 157/103   Pulse 78   Temp 98.4  F (36.9  C) (Tympanic)   Resp 18   LMP 06/22/2019 (Approximate)   SpO2 100%   Physical Exam   GENERAL: alert, no distress and over weight  EYES: Eyes grossly normal to inspection, PERRL and conjunctivae and sclerae normal  HENT: normal cephalic/atraumatic, both ears: erythematous, nose and mouth without ulcers or lesions, nasal mucosa edematous , rhinorrhea clear and yellow, oropharynx clear, oral mucous membranes moist, tonsillar erythema and sinuses: maxillary, frontal tenderness on bilateral  NECK: bilateral anterior cervical adenopathy, no asymmetry, masses, or scars and thyroid normal to palpation  RESP: lungs clear to auscultation - no rales, rhonchi or wheezes  CV: regular rate and rhythm, normal S1 S2, no S3 or S4, no murmur, click or rub, no peripheral edema and peripheral pulses strong  MS: no gross musculoskeletal defects noted, no edema

## 2022-04-28 DIAGNOSIS — J45.30 MILD PERSISTENT ASTHMA WITHOUT COMPLICATION: ICD-10-CM

## 2022-04-29 NOTE — TELEPHONE ENCOUNTER
"Pending Prescriptions:                       Disp   Refills    PROAIR  (90 Base) MCG/ACT inhaler [*8.5 g  0        Sig: INHALE 2 PUFFS INTO THE LUNGS EVERY 6 HOURS AS NEEDED           FOR SHORTNESS OF BREATH OR DIFFICULT BREATHING    Routing refill request to provider for review/approval because:  Routing to PCP, as med was prescribed in .     Requested Prescriptions   Pending Prescriptions Disp Refills    PROAIR  (90 Base) MCG/ACT inhaler [Pharmacy Med Name: PROAIR HFA ORAL INH (200  PFS) 8.5G] 8.5 g 0     Sig: INHALE 2 PUFFS INTO THE LUNGS EVERY 6 HOURS AS NEEDED FOR SHORTNESS OF BREATH OR DIFFICULT BREATHING        Asthma Maintenance Inhalers - Anticholinergics Failed - 4/28/2022  4:01 AM        Failed - Asthma control assessment score within normal limits in last 6 months     Please review ACT score.           Passed - Patient is age 12 years or older        Passed - Medication is active on med list        Passed - Recent (6 mo) or future (30 days) visit within the authorizing provider's specialty     Patient had office visit in the last 6 months or has a visit in the next 30 days with authorizing provider or within the authorizing provider's specialty.  See \"Patient Info\" tab in inbasket, or \"Choose Columns\" in Meds & Orders section of the refill encounter.           Short-Acting Beta Agonist Inhalers Protocol  Failed - 4/28/2022  4:01 AM        Failed - Asthma control assessment score within normal limits in last 6 months     Please review ACT score.           Passed - Patient is age 12 or older        Passed - Medication is active on med list        Passed - Recent (6 mo) or future (30 days) visit within the authorizing provider's specialty     Patient had office visit in the last 6 months or has a visit in the next 30 days with authorizing provider or within the authorizing provider's specialty.  See \"Patient Info\" tab in inbasket, or \"Choose Columns\" in Meds & Orders section of the refill " encounter.

## 2022-05-02 RX ORDER — ALBUTEROL SULFATE 90 UG/1
AEROSOL, METERED RESPIRATORY (INHALATION)
Qty: 8.5 G | Refills: 0 | Status: SHIPPED | OUTPATIENT
Start: 2022-05-02 | End: 2022-06-22

## 2022-05-30 DIAGNOSIS — J45.30 MILD PERSISTENT ASTHMA WITHOUT COMPLICATION: ICD-10-CM

## 2022-06-02 DIAGNOSIS — I10 HYPERTENSION GOAL BP (BLOOD PRESSURE) < 140/90: ICD-10-CM

## 2022-06-02 NOTE — TELEPHONE ENCOUNTER
"Pending Prescriptions:                       Disp   Refills    lisinopril (ZESTRIL) 10 MG tablet [Pharmac*30 tab*1        Sig: TAKE 1 TABLET(10 MG) BY MOUTH DAILY        Routing refill request to provider for review/approval because:  Team please call patient and assist with scheduling an appointment to establish care. Patient use to see Lisandra Momin.   , please advise refill.       ACE Inhibitors (Including Combos) Protocol Failed    Rerun Protocol (6/2/2022 4:01 AM)      Blood pressure under 140/90 in past 12 months        BP Readings from Last 3 Encounters:   04/06/22 (!) 148/89   05/09/21 (!) 192/98   10/28/20 (!) 134/94             Normal serum creatinine on file in past 12 months        Recent Labs   Lab Test 10/28/20  1658   CR 0.72      Ok to refill medication if creatinine is low      Normal serum potassium on file in past 12 months        Recent Labs   Lab Test 10/28/20  1658   POTASSIUM 4.0         Recent (12 mo) or future (30 days) visit within the authorizing provider's specialty    Patient has had an office visit with the authorizing provider or a provider within the authorizing providers department within the previous 12 mos or has a future within next 30 days. See \"Patient Info\" tab in inbasket, or \"Choose Columns\" in Meds & Orders section of the refill encounter.           Medication is active on med list          Patient is age 18 or older          No active pregnancy on record          No positive pregnancy test within past 12 months        "

## 2022-06-02 NOTE — TELEPHONE ENCOUNTER
Routing refill request to provider for review/approval because:  Patient needs to be seen because:  > 18 months since last Primary Care visit  ACT has not been updated since 2020.    ACT Total Scores 3/13/2017 5/22/2019 10/28/2020   ACT TOTAL SCORE - - -   ASTHMA ER VISITS - - -   ASTHMA HOSPITALIZATIONS - - -   ACT TOTAL SCORE (Goal Greater than or Equal to 20) 23 24 25   In the past 12 months, how many times did you visit the emergency room for your asthma without being admitted to the hospital? 0 0 0   In the past 12 months, how many times were you hospitalized overnight because of your asthma? 0 0 0     Kathy Mendez, SAJANN, RN

## 2022-06-03 ENCOUNTER — MYC MEDICAL ADVICE (OUTPATIENT)
Dept: FAMILY MEDICINE | Facility: CLINIC | Age: 47
End: 2022-06-03
Payer: COMMERCIAL

## 2022-06-03 RX ORDER — ALBUTEROL SULFATE 0.83 MG/ML
2.5 SOLUTION RESPIRATORY (INHALATION) EVERY 6 HOURS PRN
Qty: 180 ML | Refills: 0 | Status: SHIPPED | OUTPATIENT
Start: 2022-06-03 | End: 2024-09-23

## 2022-06-03 RX ORDER — LISINOPRIL 10 MG/1
TABLET ORAL
Qty: 30 TABLET | Refills: 1 | Status: SHIPPED | OUTPATIENT
Start: 2022-06-03 | End: 2024-09-05

## 2022-06-21 DIAGNOSIS — J45.30 MILD PERSISTENT ASTHMA WITHOUT COMPLICATION: ICD-10-CM

## 2022-06-21 NOTE — TELEPHONE ENCOUNTER
Patient is calling back to the clinic in regards to refill request below.     Patient is reaching out to PCP requesting a refill of prescription below.     Patient stated that she is currently at the [facility] with her daughter. Her daughter is currently in surgery and patient does not want to leave the hospital to get her inhaler. Mother stated that her daughter will be in surgery for 3 hours and went in at 1415.    Mother stated that she lives 30 min away and she does not have anyone to go and get her inhaler for her.     Patient stated that she is symptomatic with SHORTNESS OF BREATH.     Writer advised patient it the SHORTNESS OF BREATH gets worse to be seen in the ER or urgent care near the [facility]. Patient verbalized understanding. Patient was able to form sentenced on the phone, no audible wheezing noted.     Routing to provider to review and advise.     Daisy Garcias RN, BSN  [facility]

## 2022-06-21 NOTE — TELEPHONE ENCOUNTER
This writer attempted to contact patient on 06/21/22      Reason for call triage patient for symptoms related to request for additional inhaler and left message.      If patient calls back:   Registered Nurse called. Follow Triage Call workflow        Kathy Bhatti RN

## 2022-06-21 NOTE — TELEPHONE ENCOUNTER
"Patient requesting Albuterol inhaler, not on med list,to be sent to [facility] pharmacy, [address].  Patient would like this \"urgently\".  Rhianna Pastor [facility]  2nd Floor  Primary Care    "

## 2022-06-22 RX ORDER — ALBUTEROL SULFATE 90 UG/1
AEROSOL, METERED RESPIRATORY (INHALATION)
Qty: 8.5 G | Refills: 0 | Status: SHIPPED | OUTPATIENT
Start: 2022-06-22 | End: 2024-09-23

## 2022-10-03 NOTE — TELEPHONE ENCOUNTER
Discussed results with patient and answered patient's additional questions regarding cardiac condition, symptoms, and discuss cardiac rehab (which patient is considering if it is affordable). Patient voiced understanding of all information discussed. Reason for Call:  Other call back    Detailed comments: Patient called to check on the status of her Short Term Disability forms please states her employer did not received those forms    Phone Number Patient can be reached at: Home number on file [phone] (home)    Best Time: today    Can we leave a detailed message on this number? YES    Call taken on 4/26/2017 at 9:36 AM by Chang Fox

## 2022-11-21 ENCOUNTER — HEALTH MAINTENANCE LETTER (OUTPATIENT)
Age: 47
End: 2022-11-21

## 2023-04-16 ENCOUNTER — HEALTH MAINTENANCE LETTER (OUTPATIENT)
Age: 48
End: 2023-04-16

## 2024-04-23 ENCOUNTER — OFFICE VISIT (OUTPATIENT)
Dept: URGENT CARE | Facility: URGENT CARE | Age: 49
End: 2024-04-23
Payer: COMMERCIAL

## 2024-04-23 VITALS
TEMPERATURE: 98.6 F | HEART RATE: 76 BPM | DIASTOLIC BLOOD PRESSURE: 79 MMHG | BODY MASS INDEX: 37.99 KG/M2 | OXYGEN SATURATION: 98 % | RESPIRATION RATE: 16 BRPM | WEIGHT: 217.9 LBS | SYSTOLIC BLOOD PRESSURE: 142 MMHG

## 2024-04-23 DIAGNOSIS — J45.901 EXACERBATION OF ASTHMA, UNSPECIFIED ASTHMA SEVERITY, UNSPECIFIED WHETHER PERSISTENT: Primary | ICD-10-CM

## 2024-04-23 PROCEDURE — 99213 OFFICE O/P EST LOW 20 MIN: CPT | Performed by: PHYSICIAN ASSISTANT

## 2024-04-23 RX ORDER — ALBUTEROL SULFATE 90 UG/1
1-2 AEROSOL, METERED RESPIRATORY (INHALATION) EVERY 6 HOURS PRN
Qty: 18 G | Refills: 1 | Status: SHIPPED | OUTPATIENT
Start: 2024-04-23 | End: 2024-09-05

## 2024-04-23 RX ORDER — ALBUTEROL SULFATE 0.83 MG/ML
2.5 SOLUTION RESPIRATORY (INHALATION) EVERY 6 HOURS PRN
Qty: 90 ML | Refills: 0 | Status: SHIPPED | OUTPATIENT
Start: 2024-04-23

## 2024-04-23 RX ORDER — PREDNISONE 20 MG/1
40 TABLET ORAL DAILY
Qty: 10 TABLET | Refills: 0 | Status: SHIPPED | OUTPATIENT
Start: 2024-04-23 | End: 2024-04-28

## 2024-04-23 ASSESSMENT — PAIN SCALES - GENERAL: PAINLEVEL: NO PAIN (0)

## 2024-04-24 NOTE — PROGRESS NOTES
Chief Complaint   Patient presents with    Asthma     Patient states she believes her nebulizer medication is old because it's not working and is almost out of albuterol inhaler.     Medication Refill           ASSESSMENT:     ICD-10-CM    1. Exacerbation of asthma, unspecified asthma severity, unspecified whether persistent  J45.901             PLAN: Asthma exacerbation.  Albuterol inhaler and albuterol nebulizer solution, prednisone.  I have discussed clinical findings with patient.  Side effects of medications discussed.  Symptomatic care is discussed.  I have discussed the possibility of  worsening symptoms and indication to RTC or go to the ER if they occur.  All questions are answered, patient indicates understanding of these issues and is in agreement with plan.   Patient care instructions are discussed/given at the end of visit.       Sammie Lang PA-C      SUBJECTIVE:  48-year-old female presents for asthma exacerbation over the past few days.  Albuterol solution and albuterol inhaler are .  Cough is worse at night.  No fever, sneezing, watery itchy eyes.  Notices that asthma flares with weather changes.      Allergies   Allergen Reactions    Morphine Anxiety    Penicillins     Tilactase Diarrhea    Diclofenac Rash       Past Medical History:   Diagnosis Date    Anemia     Chlamydia 2014    Dysmenorrhea     H/O transfusion of packed red blood cells 2016    HTN (hypertension), benign 2014    Leiomyoma of uterus 2013    Migraine     Mild intermittent asthma     Obesity     Sleep apnea     TOA (tubo-ovarian abscess) 2016    Tobacco use disorder        Current Outpatient Medications   Medication Sig Dispense Refill    albuterol (PROAIR HFA) 108 (90 Base) MCG/ACT inhaler INHALE 2 PUFFS INTO THE LUNGS EVERY 6 HOURS AS NEEDED FOR SHORTNESS OF BREATH OR DIFFICULT BREATHING 8.5 g 0    albuterol (PROVENTIL) (2.5 MG/3ML) 0.083% neb solution Take 1 vial (2.5 mg) by nebulization every 6 hours as needed  for shortness of breath / dyspnea 180 mL 0    lisinopril (ZESTRIL) 10 MG tablet TAKE 1 TABLET(10 MG) BY MOUTH DAILY (Patient not taking: Reported on 4/23/2024) 30 tablet 1     No current facility-administered medications for this visit.       Social History     Tobacco Use    Smoking status: Every Day     Types: Vaping Device     Passive exposure: Never    Smokeless tobacco: Never   Substance Use Topics    Alcohol use: Yes     Alcohol/week: 0.0 standard drinks of alcohol     Comment: not often       ROS:  CONSTITUTIONAL: Negative for fatigue or fever.  EYES: Negative for eye problems.  ENT: As above.  RESP: As above.  CV: Negative for chest pains.  GI: Negative for vomiting.  MUSCULOSKELETAL:  Negative for significant muscle or joint pains.  NEUROLOGIC: Negative for headaches.  SKIN: Negative for rash.  PSYCH: Normal mentation for age.    OBJECTIVE:  BP (!) 142/79 (BP Location: Left arm, Patient Position: Sitting, Cuff Size: Adult Large)   Pulse 76   Temp 98.6  F (37  C) (Oral)   Resp 16   Wt 98.8 kg (217 lb 14.4 oz)   LMP 06/22/2019 (Approximate)   SpO2 98%   BMI 37.99 kg/m    GENERAL APPEARANCE: Healthy, alert and no distress.  EYES:Conjunctiva/sclera clear.  EARS: No cerumen.   Ear canals w/o erythema.  TM's intact w/o erythema.    NECK: Supple, nontender, no lymphadenopathy.  RESP: Lungs clear to auscultation - no rales, rhonchi or wheezes but just used her inhaler.  CV: Regular rate and rhythm, normal S1 S2, no murmur noted.  NEURO: Awake, alert    SKIN: No rashes      Sammie Lang PA-C

## 2024-04-26 ENCOUNTER — NURSE TRIAGE (OUTPATIENT)
Dept: NURSING | Facility: CLINIC | Age: 49
End: 2024-04-26
Payer: COMMERCIAL

## 2024-04-26 ENCOUNTER — OFFICE VISIT (OUTPATIENT)
Dept: URGENT CARE | Facility: URGENT CARE | Age: 49
End: 2024-04-26
Payer: COMMERCIAL

## 2024-04-26 VITALS
HEART RATE: 89 BPM | WEIGHT: 212.5 LBS | RESPIRATION RATE: 24 BRPM | BODY MASS INDEX: 37.05 KG/M2 | SYSTOLIC BLOOD PRESSURE: 144 MMHG | DIASTOLIC BLOOD PRESSURE: 91 MMHG | OXYGEN SATURATION: 100 % | TEMPERATURE: 99.5 F

## 2024-04-26 DIAGNOSIS — K52.9 GASTROENTERITIS: ICD-10-CM

## 2024-04-26 DIAGNOSIS — R11.2 NAUSEA AND VOMITING, UNSPECIFIED VOMITING TYPE: Primary | ICD-10-CM

## 2024-04-26 PROCEDURE — 99213 OFFICE O/P EST LOW 20 MIN: CPT | Performed by: FAMILY MEDICINE

## 2024-04-26 RX ORDER — LOPERAMIDE HYDROCHLORIDE 2 MG/1
2 TABLET ORAL 4 TIMES DAILY PRN
Qty: 20 TABLET | Refills: 0 | Status: SHIPPED | OUTPATIENT
Start: 2024-04-26 | End: 2024-09-23

## 2024-04-26 RX ORDER — ONDANSETRON 4 MG/1
4 TABLET, ORALLY DISINTEGRATING ORAL EVERY 8 HOURS PRN
Qty: 20 TABLET | Refills: 0 | Status: SHIPPED | OUTPATIENT
Start: 2024-04-26 | End: 2024-09-23

## 2024-04-26 RX ORDER — BISMUTH SUBSALICYLATE 262 MG/1
1 TABLET, CHEWABLE ORAL
Qty: 30 TABLET | Refills: 0 | Status: SHIPPED | OUTPATIENT
Start: 2024-04-26 | End: 2024-09-23

## 2024-04-26 ASSESSMENT — ENCOUNTER SYMPTOMS
DIARRHEA: 1
ABDOMINAL PAIN: 1
NAUSEA: 1
VOMITING: 1

## 2024-04-26 NOTE — PATIENT INSTRUCTIONS
- Recommend supportive therapies like fluids, Zofran for nausea, Imodium and Pepto-bismuth for diarrhea, and Tylenol for pain.    - Recommend going to the ED for further work-up and getting IV fluids if symptoms worsen or start having bloody diarrhea.

## 2024-04-26 NOTE — TELEPHONE ENCOUNTER
"Pt reports she has had vomiting and diarrhea since yesterday. TA temperature 99.9 \"couple of hours ago\". Pt reports vomiting is worse than diarrhea. Pt reports in past 24 hours she has vomited four times and had 10 + loose stools. At time of call temperature 98.6 per pt. Pt reports she last urinated \"about 30 minutes ago\". Pt reports \"a little lightheaded\". Pt reports she also has a headache \"not too bad\". Pt reports her abdomen is much more swollen than usual.     Writer advised pt to be seen in UC today per protocol, another adult should drive, try small amounts of half water/half Gatorade frequently. Bring container in case continue to vomit. Call back if needing further assistance.     Pt verbalizes understanding and is agreeable to plan.       Reason for Disposition   Vomiting and abdomen looks much more swollen than usual    Additional Information   Negative: Shock suspected (e.g., cold/pale/clammy skin, too weak to stand, low BP, rapid pulse)   Negative: Difficult to awaken or acting confused (e.g., disoriented, slurred speech)   Negative: Sounds like a life-threatening emergency to the triager   Negative: Vomiting occurs only while coughing   Negative: Pregnant < 20 Weeks and nausea/vomiting began in early pregnancy (i.e., 4-8 weeks pregnant)   Negative: Chest pain   Negative: Headache is main symptom   Negative: Vomiting red blood or black (coffee ground) material   Negative: Vomiting and hernia is more painful or swollen than usual   Negative: Recent head injury (within 3 days)   Negative: Recent abdominal injury (within 7 days)   Negative: Insulin-dependent diabetes and glucose > 240 mg/dL (13 mmol/L)   Negative: Severe pain in one eye   Negative: SEVERE vomiting (e.g., 6 or more times/day)  (Exception: Patient sounds well, is drinking liquids, does not sound dehydrated, and vomiting has lasted less than 24 hours.)   Negative: MODERATE vomiting (e.g., 3 - 5 times/day) and age > 60 years   Negative: " Constant abdominal pain lasting > 2 hours   Negative: High-risk adult (e.g., brain tumor, V-P shunt, hernia)   Negative: Drinking very little and has signs of dehydration (e.g., no urine > 12 hours, very dry mouth, very lightheaded)   Negative: Patient sounds very sick or weak to the triager    Protocols used: Vomiting-A-OH

## 2024-04-26 NOTE — PROGRESS NOTES
Patient presents with:  Nausea, Vomiting, & Diarrhea: Started last night  Headache: Started today      Clinical Decision Making:      ICD-10-CM    1. Nausea and vomiting, unspecified vomiting type  R11.2 ondansetron (ZOFRAN ODT) 4 MG ODT tab      2. Gastroenteritis  K52.9 ondansetron (ZOFRAN ODT) 4 MG ODT tab     loperamide (IMODIUM A-D) 2 MG tablet     bismuth subsalicylate (PEPTO BISMOL) 262 MG chewable tablet        Acute gastroenteritis and colitis are likely secondary to viruses or bacteria.   Patient Instructions       - Recommend supportive therapies like fluids, Zofran for nausea, Imodium and Pepto-bismuth for diarrhea, and Tylenol for pain.    - Recommend going to the ED for further work-up and getting IV fluids if symptoms worsen or start having bloody diarrhea.      HPI:  Jennifer Gonsalez is a 48 year old female who presents today complaining of 1 day of abdominal pain, N/V/D    History obtained from the patient.    Problem List:  2021: Morbid obesity (H)  2020-10: Post-traumatic headache  2019: Obesity (BMI 35.0-39.9) with comorbidity (H)  2019: Class 1 obesity due to excess calories with body mass index   (BMI) of 33.0 to 33.9 in adult  2016: Primary insomnia  2016: Other iron deficiency anemia  2016: Hormone replacement therapy (HRT)  2016: Hypokalemia  2016: Abscess of female pelvis  2016: Empyema (H)  2016: Systemic infection (H)  2016: Obesity with body mass index 30 or greater  2016: Pleural effusion  2016: Tubo-ovarian abscess  2015: Submental mass  2015: Non morbid obesity due to excess calories  2015: Encounter for supervision of other normal pregnancy  2015: AMA (advanced maternal age) multigravida 35+  2015: S/P myomectomy  2015: Missed   2014: Acute reaction to stress  2014: S/P laparoscopic cholecystectomy  2013: Abdominal pain, epigastric  2013: Umbilical hernia  2013-10: Fibroids  2013: Seasonal allergic  rhinitis  2012-09: Hypertension goal BP (blood pressure) < 140/90  2012-06: GERD (gastroesophageal reflux disease)  2012-02: Vaginal pain  2010-10: CARDIOVASCULAR SCREENING; LDL GOAL LESS THAN 160  2010-07: Pain in limb  2010-07: Arm pain  2010-07: Mild persistent asthma  Tobacco use disorder  Dysmenorrhea      Past Medical History:   Diagnosis Date    Anemia     Chlamydia 2014    Dysmenorrhea     H/O transfusion of packed red blood cells 2016    HTN (hypertension), benign 2014    Leiomyoma of uterus 2013    Migraine     Mild intermittent asthma     Obesity     Sleep apnea     TOA (tubo-ovarian abscess) 2016    Tobacco use disorder        Social History     Tobacco Use    Smoking status: Every Day     Types: Vaping Device     Passive exposure: Never    Smokeless tobacco: Never   Substance Use Topics    Alcohol use: Yes     Alcohol/week: 0.0 standard drinks of alcohol     Comment: not often       Review of Systems   Gastrointestinal:  Positive for abdominal pain, diarrhea, nausea and vomiting.       Vitals:    04/26/24 1709   BP: (!) 144/91   Pulse: 89   Resp: 24   Temp: 99.5  F (37.5  C)   TempSrc: Tympanic   SpO2: 100%   Weight: 96.4 kg (212 lb 8 oz)       Physical Exam  Abdominal:      General: Abdomen is flat.      Palpations: Abdomen is soft.      Tenderness: There is abdominal tenderness. There is no guarding or rebound.         Results:  No results found for any visits on 04/26/24.      At the end of the encounter, I discussed results, diagnosis, medications. Discussed red flags for immediate return to clinic/ER, as well as indications for follow up if no improvement. Patient understood and agreed to plan. Patient was stable for discharge.

## 2024-05-14 ENCOUNTER — OFFICE VISIT (OUTPATIENT)
Dept: URGENT CARE | Facility: URGENT CARE | Age: 49
End: 2024-05-14
Payer: COMMERCIAL

## 2024-05-14 VITALS
WEIGHT: 210 LBS | BODY MASS INDEX: 36.62 KG/M2 | HEART RATE: 68 BPM | SYSTOLIC BLOOD PRESSURE: 148 MMHG | OXYGEN SATURATION: 97 % | DIASTOLIC BLOOD PRESSURE: 98 MMHG | RESPIRATION RATE: 20 BRPM | TEMPERATURE: 98.6 F

## 2024-05-14 DIAGNOSIS — J30.81 ALLERGIC RHINITIS DUE TO ANIMALS: Primary | ICD-10-CM

## 2024-05-14 DIAGNOSIS — J45.901 EXACERBATION OF ASTHMA, UNSPECIFIED ASTHMA SEVERITY, UNSPECIFIED WHETHER PERSISTENT: ICD-10-CM

## 2024-05-14 PROCEDURE — 99213 OFFICE O/P EST LOW 20 MIN: CPT | Performed by: PHYSICIAN ASSISTANT

## 2024-05-14 RX ORDER — CETIRIZINE HYDROCHLORIDE 10 MG/1
10 TABLET ORAL DAILY
Qty: 90 TABLET | Refills: 0 | Status: SHIPPED | OUTPATIENT
Start: 2024-05-14 | End: 2024-08-12

## 2024-05-14 NOTE — PROGRESS NOTES
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 Allergic rhinitis due to animals  J30.81 cetirizine (ZYRTEC) 10 MG tablet      2. Exacerbation of asthma, unspecified asthma severity, unspecified whether persistent  J45.901 cetirizine (ZYRTEC) 10 MG tablet            PLAN: Asthma flare from cat exposure at work.  Keep allergist appointment for June.  Start Zyrtec.  Note given for work to keep her away from the cat.  Can continue asthma inhaler and nebulizer.    I have discussed clinical findings with patient.  Side effects of medications discussed.  Symptomatic care is discussed.  I have discussed the possibility of  worsening symptoms and indication to RTC or go to the ER if they occur.  All questions are answered, patient indicates understanding of these issues and is in agreement with plan.   Patient care instructions are discussed/given at the end of visit.   Lots of rest and fluids.      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  She is had allergy issues with cats and dogs in the past.  Has an allergy appointment but not until June.  She states that day it was very difficult to breeze and she lost her voice.  She did take some Benadryl.    Allergies   Allergen Reactions    Morphine Anxiety    Cats     Penicillins     Tilactase Diarrhea    Diclofenac Rash       Past Medical History:   Diagnosis Date    Anemia     Chlamydia 2014    Dysmenorrhea     H/O transfusion of packed red  blood cells 2016    HTN (hypertension), benign 2014    Leiomyoma of uterus 2013    Migraine     Mild intermittent asthma     Obesity     Sleep apnea     TOA (tubo-ovarian abscess) 2016    Tobacco use disorder        Current Outpatient Medications   Medication Sig Dispense Refill    albuterol (PROAIR HFA) 108 (90 Base) MCG/ACT inhaler INHALE 2 PUFFS INTO THE LUNGS EVERY 6 HOURS AS NEEDED FOR SHORTNESS OF BREATH OR DIFFICULT BREATHING 8.5 g 0    albuterol (PROAIR HFA/PROVENTIL HFA/VENTOLIN HFA) 108 (90 Base) MCG/ACT inhaler Inhale 1-2 puffs into the lungs every 6 hours as needed for shortness of breath, wheezing or cough 18 g 1    albuterol (PROVENTIL) (2.5 MG/3ML) 0.083% neb solution Take 1 vial (2.5 mg) by nebulization every 6 hours as needed for shortness of breath, wheezing or cough 90 mL 0    albuterol (PROVENTIL) (2.5 MG/3ML) 0.083% neb solution Take 1 vial (2.5 mg) by nebulization every 6 hours as needed for shortness of breath / dyspnea 180 mL 0    bismuth subsalicylate (PEPTO BISMOL) 262 MG chewable tablet Take 1 tablet (262 mg) by mouth 4 times daily (before meals and nightly) 30 tablet 0    cetirizine (ZYRTEC) 10 MG tablet Take 1 tablet (10 mg) by mouth daily for 90 days 90 tablet 0    loperamide (IMODIUM A-D) 2 MG tablet Take 1 tablet (2 mg) by mouth 4 times daily as needed for diarrhea 20 tablet 0    ondansetron (ZOFRAN ODT) 4 MG ODT tab Take 1 tablet (4 mg) by mouth every 8 hours as needed for nausea 20 tablet 0    lisinopril (ZESTRIL) 10 MG tablet TAKE 1 TABLET(10 MG) BY MOUTH DAILY (Patient not taking: Reported on 4/26/2024) 30 tablet 1     No current facility-administered medications for this visit.       Social History     Tobacco Use    Smoking status: Every Day     Types: Vaping Device     Passive exposure: Never    Smokeless tobacco: Never   Substance Use Topics    Alcohol use: Yes     Alcohol/week: 0.0 standard drinks of alcohol     Comment: not often       ROS:  CONSTITUTIONAL: Negative for  fatigue or fever.  EYES: Negative for eye problems.  ENT: As above.  RESP: As above.  CV: Negative for chest pains.  GI: Negative for vomiting.  MUSCULOSKELETAL:  Negative for significant muscle or joint pains.  NEUROLOGIC: Negative for headaches.  SKIN: Negative for rash.  PSYCH: Normal mentation for age.    OBJECTIVE:  BP (!) 148/98   Pulse 68   Temp 98.6  F (37  C) (Tympanic)   Resp 20   Wt 95.3 kg (210 lb)   LMP 06/22/2019 (Approximate)   SpO2 97%   BMI 36.62 kg/m    GENERAL APPEARANCE: Healthy, alert and no distress.  EYES:Conjunctiva/sclera clear.  EARS: No cerumen.   Ear canals w/o erythema.  TM's intact w/o erythema.    THROAT: No erythema w/o tonsillar enlargement . No exudates.  NECK: Supple, nontender, no lymphadenopathy.  RESP: Lungs clear to auscultation - no rales, rhonchi or wheezes  CV: Regular rate and rhythm, normal S1 S2, no murmur noted.  NEURO: Awake, alert    SKIN: No rashes      Sammie Lang PA-C

## 2024-05-14 NOTE — LETTER
May 14, 2024      Jennifer Gonsalez  [address] [facility] [address]        To Whom It May Concern:    Jennifer Gonsalez  was seen on 5/14/2024. Had a bad asthma from being around the cat. Please do not have her around any cats.        Sincerely,        Sammie Lang PA-C

## 2024-06-22 ENCOUNTER — HEALTH MAINTENANCE LETTER (OUTPATIENT)
Age: 49
End: 2024-06-22

## 2024-08-20 ENCOUNTER — TELEPHONE (OUTPATIENT)
Dept: FAMILY MEDICINE | Facility: CLINIC | Age: 49
End: 2024-08-20
Payer: COMMERCIAL

## 2024-08-20 NOTE — TELEPHONE ENCOUNTER
Informed pt of providers message below.  Pt stated it was PCP's fault for not being available when pt was wanting to be seen.  Pt stated that it is ridiculous that she should not have to wait to be seen and that she will find another physician that will see her when she wants.

## 2024-08-20 NOTE — TELEPHONE ENCOUNTER
Forms/Letter Request    Type of form/letter: OTHER: Accommodation letter    Patient states she needs a letter stating her asthma is triggered by heat and she needs air conditioning.         Patient has soonest available appointment booked for 09/05/2024. However patient state she needs this letter before then.    Do we have the form/letter: No    Who is the form from? provider     Where did/will the form come from? Letter created by provider     When is form/letter needed by: 08/21/2024    How would you like the form/letter returned:  and [email or web address]    Patient Notified form requests are processed in 5-7 business days:Yes    Could we send this information to you in [employer] or would you prefer to receive a phone call?:   Patient would prefer a phone call   Okay to leave a detailed message?: Yes at Cell number on file:    Telephone Information:   Mobile [phone]

## 2024-08-20 NOTE — TELEPHONE ENCOUNTER
I haven't seen this patient since 2020.  Can see someone else if letter need prior to scheduled appt.    Myla Obrien MD

## 2024-08-28 ENCOUNTER — PATIENT OUTREACH (OUTPATIENT)
Dept: CARE COORDINATION | Facility: CLINIC | Age: 49
End: 2024-08-28
Payer: COMMERCIAL

## 2024-09-05 ENCOUNTER — VIRTUAL VISIT (OUTPATIENT)
Dept: FAMILY MEDICINE | Facility: CLINIC | Age: 49
End: 2024-09-05
Payer: COMMERCIAL

## 2024-09-05 DIAGNOSIS — Z12.31 VISIT FOR SCREENING MAMMOGRAM: ICD-10-CM

## 2024-09-05 DIAGNOSIS — F51.01 PRIMARY INSOMNIA: ICD-10-CM

## 2024-09-05 DIAGNOSIS — J45.30 MILD PERSISTENT ASTHMA WITHOUT COMPLICATION: Primary | ICD-10-CM

## 2024-09-05 PROCEDURE — G2211 COMPLEX E/M VISIT ADD ON: HCPCS | Mod: 95 | Performed by: FAMILY MEDICINE

## 2024-09-05 PROCEDURE — 99214 OFFICE O/P EST MOD 30 MIN: CPT | Mod: 95 | Performed by: FAMILY MEDICINE

## 2024-09-05 RX ORDER — HYDROXYZINE HYDROCHLORIDE 25 MG/1
25-50 TABLET, FILM COATED ORAL
Qty: 90 TABLET | Refills: 1 | Status: SHIPPED | OUTPATIENT
Start: 2024-09-05

## 2024-09-05 RX ORDER — ALBUTEROL SULFATE 90 UG/1
1-2 AEROSOL, METERED RESPIRATORY (INHALATION) EVERY 6 HOURS PRN
Qty: 18 G | Refills: 1 | Status: SHIPPED | OUTPATIENT
Start: 2024-09-05

## 2024-09-05 ASSESSMENT — ASTHMA QUESTIONNAIRES
QUESTION_1 LAST FOUR WEEKS HOW MUCH OF THE TIME DID YOUR ASTHMA KEEP YOU FROM GETTING AS MUCH DONE AT WORK, SCHOOL OR AT HOME: SOME OF THE TIME
QUESTION_4 LAST FOUR WEEKS HOW OFTEN HAVE YOU USED YOUR RESCUE INHALER OR NEBULIZER MEDICATION (SUCH AS ALBUTEROL): NOT AT ALL
ACT_TOTALSCORE: 21
QUESTION_3 LAST FOUR WEEKS HOW OFTEN DID YOUR ASTHMA SYMPTOMS (WHEEZING, COUGHING, SHORTNESS OF BREATH, CHEST TIGHTNESS OR PAIN) WAKE YOU UP AT NIGHT OR EARLIER THAN USUAL IN THE MORNING: ONCE OR TWICE
QUESTION_5 LAST FOUR WEEKS HOW WOULD YOU RATE YOUR ASTHMA CONTROL: COMPLETELY CONTROLLED
ACT_TOTALSCORE: 21
QUESTION_2 LAST FOUR WEEKS HOW OFTEN HAVE YOU HAD SHORTNESS OF BREATH: ONCE OR TWICE A WEEK

## 2024-09-05 NOTE — PROGRESS NOTES
"Jennifer is a 48 year old who is being evaluated via a billable video visit.      If patient has telephone visit, have they been educated on video visit as preferred visit method and offered to change to video visit? yes        Instructions Relayed to Patient by Virtual Roomer:     Patient is active on [employer]:   Relayed following to patient: \"It looks like you are active on [employer], are you able to join the visit this way? If not, do you need us to send you a link now or would you like your provider to send a link via text or email when they are ready to initiate the visit?\"      Patient Confirmed they will join visit via: [employer]  Reminded patient to ensure they were logged on to virtual visit by arrival time listed.   Asked if patient has flexibility to initiate visit sooner than arrival time: patient stated yes, documented in appointment notes availability to initiate visit earlier than arrival time     If pediatric virtual visit, ensured pediatric patient along with parent/guardian will be present for video visit.     Patient offered the website [email or web address] and/or phone number to [employer] Help line: [phone] How would you like to obtain your AVS? [employer]  If the video visit is dropped, the invitation should be resent by: Text to cell phone: [phone]  Will anyone else be joining your video visit? No      Assessment & Plan     Mild persistent asthma without complication  Stable but not able to stay in home when weather is very hot - Refill albuterol and letter given for program which should repair her broken AC.  - albuterol (PROAIR HFA/PROVENTIL HFA/VENTOLIN HFA) 108 (90 Base) MCG/ACT inhaler; Inhale 1-2 puffs into the lungs every 6 hours as needed for shortness of breath, wheezing or cough.    Primary insomnia  Restart hydroxyzine prn.  - hydrOXYzine HCl (ATARAX) 25 MG tablet; Take 1-2 tablets (25-50 mg) by mouth nightly as needed (for sleep).    Visit for screening mammogram    - " MA Screening Bilateral w/ Ricardo; Future          Nicotine/Tobacco Cessation  She reports that she has been smoking vaping device. She has never been exposed to tobacco smoke. She has never used smokeless tobacco.  Nicotine/Tobacco Cessation Plan  Information offered: Patient not interested at this time    The uses and side effects, including black box warnings as appropriate, were discussed in detail.  All patient questions were answered.  The patient was instructed to call immediately if any side effects developed.     Follow up 9/23 for exam and menopause treatment discussion.    Cristhian Rodriguez is a 48 year old, presenting for the following health issues:  Letter Request (Letter for air conditioner )        9/5/2024     9:27 AM   Additional Questions   Roomed by Omari WEEKS       Video Start Time:  10:29 am    History of Present Illness       Reason for visit:  Letter for air conditioner   She is taking medications regularly.     Asthma triggered by daughter's dog and the heat.  Also having more stress related to daughter and son's relationship issues.    HTN - has been off medications for years. Checking BP and typical reading 135/85.    Postmenopause syndrome - still getting hot flashes and night sweats. Having insomnia related to/in addition to this as well.          Review of Systems  Constitutional, HEENT, cardiovascular, pulmonary, gi and gu systems are negative, except as otherwise noted.      Objective           Vitals:  No vitals were obtained today due to virtual visit.    Physical Exam   GENERAL: alert and no distress  EYES: Eyes grossly normal to inspection.  No discharge or erythema, or obvious scleral/conjunctival abnormalities.  RESP: No audible wheeze, cough, or visible cyanosis.    SKIN: Visible skin clear. No significant rash, abnormal pigmentation or lesions.  NEURO: Cranial nerves grossly intact.  Mentation and speech appropriate for age.  PSYCH: mentation appears normal and affect flat           Video-Visit Details    Type of service:  Video Visit   Video End Time:10:45 AM  Originating Location (pt. Location): Home    Distant Location (provider location):  On-site  Platform used for Video Visit: [employer]  Signed Electronically by: Myla Obrien MD

## 2024-09-05 NOTE — LETTER
September 5, 2024      RE:Jennifer Gonsalez  [address]  [facility] [address]        To Whom It May Concern,       Jennifer Gonsalez 1975 has mild persistent asthma which is worsened by heat and humidity.  Not have a functioning air conditioner is negative impacting her health.    Sincerely,    Myla Obrien MD

## 2024-09-23 ENCOUNTER — OFFICE VISIT (OUTPATIENT)
Dept: FAMILY MEDICINE | Facility: CLINIC | Age: 49
End: 2024-09-23
Payer: COMMERCIAL

## 2024-09-23 VITALS
TEMPERATURE: 97.6 F | HEIGHT: 64 IN | DIASTOLIC BLOOD PRESSURE: 84 MMHG | HEART RATE: 81 BPM | OXYGEN SATURATION: 99 % | WEIGHT: 216 LBS | RESPIRATION RATE: 18 BRPM | BODY MASS INDEX: 36.88 KG/M2 | SYSTOLIC BLOOD PRESSURE: 120 MMHG

## 2024-09-23 DIAGNOSIS — F41.1 GENERALIZED ANXIETY DISORDER: ICD-10-CM

## 2024-09-23 DIAGNOSIS — N95.1 PERIMENOPAUSAL: ICD-10-CM

## 2024-09-23 DIAGNOSIS — Z12.4 CERVICAL CANCER SCREENING: ICD-10-CM

## 2024-09-23 DIAGNOSIS — I10 HYPERTENSION GOAL BP (BLOOD PRESSURE) < 140/90: Primary | ICD-10-CM

## 2024-09-23 LAB
ANION GAP SERPL CALCULATED.3IONS-SCNC: 11 MMOL/L (ref 7–15)
BUN SERPL-MCNC: 14 MG/DL (ref 6–20)
CALCIUM SERPL-MCNC: 9.4 MG/DL (ref 8.8–10.4)
CHLORIDE SERPL-SCNC: 107 MMOL/L (ref 98–107)
CHOLEST SERPL-MCNC: 176 MG/DL
CREAT SERPL-MCNC: 0.94 MG/DL (ref 0.51–0.95)
EGFRCR SERPLBLD CKD-EPI 2021: 74 ML/MIN/1.73M2
FASTING STATUS PATIENT QL REPORTED: NO
FASTING STATUS PATIENT QL REPORTED: NO
GLUCOSE SERPL-MCNC: 115 MG/DL (ref 70–99)
HCO3 SERPL-SCNC: 27 MMOL/L (ref 22–29)
HDLC SERPL-MCNC: 53 MG/DL
LDLC SERPL CALC-MCNC: 89 MG/DL
NONHDLC SERPL-MCNC: 123 MG/DL
POTASSIUM SERPL-SCNC: 3.2 MMOL/L (ref 3.4–5.3)
SODIUM SERPL-SCNC: 145 MMOL/L (ref 135–145)
TRIGL SERPL-MCNC: 168 MG/DL

## 2024-09-23 PROCEDURE — G2211 COMPLEX E/M VISIT ADD ON: HCPCS | Performed by: FAMILY MEDICINE

## 2024-09-23 PROCEDURE — 80061 LIPID PANEL: CPT | Performed by: FAMILY MEDICINE

## 2024-09-23 PROCEDURE — 99214 OFFICE O/P EST MOD 30 MIN: CPT | Performed by: FAMILY MEDICINE

## 2024-09-23 PROCEDURE — 36415 COLL VENOUS BLD VENIPUNCTURE: CPT | Performed by: FAMILY MEDICINE

## 2024-09-23 PROCEDURE — 80048 BASIC METABOLIC PNL TOTAL CA: CPT | Performed by: FAMILY MEDICINE

## 2024-09-23 RX ORDER — FLUOXETINE 10 MG/1
10 CAPSULE ORAL AT BEDTIME
Qty: 30 CAPSULE | Refills: 1 | Status: SHIPPED | OUTPATIENT
Start: 2024-09-23

## 2024-09-23 ASSESSMENT — ENCOUNTER SYMPTOMS: NERVOUS/ANXIOUS: 1

## 2024-09-23 ASSESSMENT — ANXIETY QUESTIONNAIRES
7. FEELING AFRAID AS IF SOMETHING AWFUL MIGHT HAPPEN: NOT AT ALL
GAD7 TOTAL SCORE: 11
GAD7 TOTAL SCORE: 11
8. IF YOU CHECKED OFF ANY PROBLEMS, HOW DIFFICULT HAVE THESE MADE IT FOR YOU TO DO YOUR WORK, TAKE CARE OF THINGS AT HOME, OR GET ALONG WITH OTHER PEOPLE?: NOT DIFFICULT AT ALL
GAD7 TOTAL SCORE: 11

## 2024-09-23 NOTE — PROGRESS NOTES
"  Assessment & Plan     Hypertension goal BP (blood pressure) < 140/90  Lifestyle controlled - continue current treatment and check labs today  - Lipid panel reflex to direct LDL Non-fasting; Future  - BASIC METABOLIC PANEL; Future    Cervical cancer screening  needed    Generalized anxiety disorder  New and triggering hot flashes - trial of fluoxetine.  Offered therapy but refused for now.  - FLUoxetine (PROZAC) 10 MG capsule; Take 1 capsule (10 mg) by mouth at bedtime.    Perimenopausal  As above.  - FLUoxetine (PROZAC) 10 MG capsule; Take 1 capsule (10 mg) by mouth at bedtime.          BMI  Estimated body mass index is 37.66 kg/m  as calculated from the following:    Height as of this encounter: 1.613 m (5' 3.5\").    Weight as of this encounter: 98 kg (216 lb).   Weight management plan: Discussed healthy diet and exercise guidelines      The uses and side effects, including black box warnings as appropriate, were discussed in detail.  All patient questions were answered.  The patient was instructed to call immediately if any side effects developed.     Follow up in 1 month    Cristhian Rodriguez is a 49 year old, presenting for the following health issues:  Anxiety        9/23/2024    10:12 AM   Additional Questions   Roomed by Basia   Accompanied by self         9/23/2024    10:12 AM   Patient Reported Additional Medications   Patient reports taking the following new medications No     History of Present Illness       Mental Health Follow-up:  Patient presents to follow-up on Anxiety.    Patient's anxiety since last visit has been:  Medium  The patient is not having other symptoms associated with anxiety.  Any significant life events: job concerns and financial concerns  Patient is feeling anxious or having panic attacks.  Patient has no concerns about alcohol or drug use.    She eats 0-1 servings of fruits and vegetables daily.She consumes 0 sweetened beverage(s) daily.She exercises with enough effort to increase " "her heart rate 9 or less minutes per day.  She exercises with enough effort to increase her heart rate 3 or less days per week.   She is taking medications regularly.         Hypertension Follow-up    Do you check your blood pressure regularly outside of the clinic? No   Are you following a low salt diet? Yes  Are your blood pressures ever more than 140 on the top number (systolic) OR more   than 90 on the bottom number (diastolic), for example 140/90? No        Review of Systems  Constitutional, HEENT, cardiovascular, pulmonary, gi and gu systems are negative, except as otherwise noted.      Objective    /84 (BP Location: Right arm, Patient Position: Sitting, Cuff Size: Adult Large)   Pulse 81   Temp 97.6  F (36.4  C) (Temporal)   Resp 18   Ht 1.613 m (5' 3.5\")   Wt 98 kg (216 lb)   LMP 06/22/2019 (Approximate)   SpO2 99%   BMI 37.66 kg/m    Body mass index is 37.66 kg/m .  Physical Exam   GENERAL: alert and no distress  NECK: no adenopathy, no asymmetry, masses, or scars  RESP: lungs clear to auscultation - no rales, rhonchi or wheezes  CV: regular rate and rhythm, normal S1 S2, no S3 or S4, no murmur, click or rub, no peripheral edema  ABDOMEN: soft, nontender, no hepatosplenomegaly, no masses and bowel sounds normal  MS: no gross musculoskeletal defects noted, no edema  PSYCH: mentation appears normal and affect flat            Signed Electronically by: Myla Obrien MD    "

## 2024-10-07 ENCOUNTER — VIRTUAL VISIT (OUTPATIENT)
Dept: FAMILY MEDICINE | Facility: CLINIC | Age: 49
End: 2024-10-07
Attending: FAMILY MEDICINE
Payer: COMMERCIAL

## 2024-10-07 DIAGNOSIS — I10 HYPERTENSION GOAL BP (BLOOD PRESSURE) < 140/90: ICD-10-CM

## 2024-10-07 DIAGNOSIS — F41.1 GENERALIZED ANXIETY DISORDER: ICD-10-CM

## 2024-10-07 DIAGNOSIS — N95.1 PERIMENOPAUSAL: ICD-10-CM

## 2024-10-07 DIAGNOSIS — E66.01 MORBID OBESITY (H): Primary | ICD-10-CM

## 2024-10-07 PROCEDURE — 99442 PR PHYSICIAN TELEPHONE EVALUATION 11-20 MIN: CPT | Mod: 93 | Performed by: FAMILY MEDICINE

## 2024-10-07 NOTE — PATIENT INSTRUCTIONS
At [facility], we strive to deliver an exceptional experience to you, every time we see you. If you receive a survey, please let us know what we are doing well and/or what we could improve upon, as we do value your feedback.  If you have MyChart, you can expect to receive results automatically within 24 hours of their completion.  Your provider will send a note interpreting your results as well.   If you do not have MyChart, you should receive your results in about a week by mail.    Your care team:                            Family Medicine Internal Medicine   MD Nathan Leslie, MD Myla Mujica, MD Chas Acuna, MD Ellen Abreu, PARamaC    Joshua Escobar, MD Pediatrics   Bere Steinberg, MD Alia Yanez, MD Andria Mirza, APRN CNP Erin Corrales APRN CNP   MD Nano Rea, MD Dolly Van, CNP     Nelson Malave, CNP Same-Day Provider (No follow-up visits)   JOSUE Norris, DNP Lizzie Benites, JOSUE Coto, FNP, BC FRANCO BrooksC     Clinic hours: Monday - Thursday 7 am-6 pm; Fridays 7 am-5 pm.   Urgent care: Monday - Friday 10 am- 8 pm; Saturday and Sunday 9 am-5 pm.    Clinic: [phone]       [facility]: Monday - Thursday 8 am - 7 pm; Friday 8 am - 6 pm  [facility]: [phone]

## 2024-10-07 NOTE — PROGRESS NOTES
"  If patient has telephone visit, have they been educated on video visit as preferred visit method and offered to change to video visit? NOT APPLICABLE        Instructions Relayed to Patient by Virtual Roomer:     Patient is active on [employer]:   Relayed following to patient: \"It looks like you are active on [employer], are you able to join the visit this way? If not, do you need us to send you a link now or would you like your provider to send a link via text or email when they are ready to initiate the visit?\"      Patient Confirmed they will join visit via: [employer]  Reminded patient to ensure they were logged on to virtual visit by arrival time listed.   Asked if patient has flexibility to initiate visit sooner than arrival time: patient stated yes, documented in appointment notes availability to initiate visit earlier than arrival time     If pediatric virtual visit, ensured pediatric patient along with parent/guardian will be present for video visit.     Patient offered the website [email or web address] and/or phone number to [employer] Help line: [phone]  Jennifer is a 49 year old who is being evaluated via a billable telephone visit.    What phone number would you like to be contacted at? [phone]   How would you like to obtain your AVS? [employer]  Originating Location (pt. Location): Home    Distant Location (provider location):  On-site    Assessment & Plan     Morbid obesity (H)  Morbid due to HTN and Asthma as weight loss would improve both these conditions = trial of Wegovy and follow up in 3 months.  - Semaglutide-Weight Management (WEGOVY) 0.25 MG/0.5ML pen; Inject 0.25 mg subcutaneously once a week.  - Semaglutide-Weight Management (WEGOVY) 0.5 MG/0.5ML pen; Inject 0.5 mg subcutaneously once a week.    Hypertension goal BP (blood pressure) < 140/90  As above    Generalized anxiety disorder  Improved - continue current treatment and try hydroxyzine at night for " sleep.    Perimenopausal  Improved - continue current treatment      The uses and side effects, including black box warnings as appropriate, were discussed in detail.  All patient questions were answered.  The patient was instructed to call immediately if any side effects developed.     Cristhian Rodriguez is a 49 year old, presenting for the following health issues:  Recheck Medication    History of Present Illness       Mental Health Follow-up:  Patient presents to follow-up on Anxiety.    Patient's anxiety since last visit has been:  Medium  The patient is not having other symptoms associated with anxiety.  Any significant life events: job concerns and financial concerns  Patient is feeling anxious or having panic attacks.  Patient has no concerns about alcohol or drug use.        Medication Followup of Prozac  Taking Medication as prescribed: yes  Side Effects:  None  Medication Helping Symptoms:  yes  Not using hydroxyzine at night for sleep  Less night sweats and mood swings as well.      Interested on weight management treatments as well      Review of Systems  Constitutional, HEENT, cardiovascular, pulmonary, gi and gu systems are negative, except as otherwise noted.      Objective    Vitals - Patient Reported  Pain Score: No Pain (0)        Physical Exam   General: Alert and no distress //Respiratory: No audible wheeze, cough, or shortness of breath // Psychiatric:  Appropriate affect, tone, and pace of words            Phone call duration: 15 minutes  Signed Electronically by: Myla Obrien MD

## 2024-10-08 ENCOUNTER — TELEPHONE (OUTPATIENT)
Dept: FAMILY MEDICINE | Facility: CLINIC | Age: 49
End: 2024-10-08

## 2024-10-08 NOTE — LETTER
10/9/2024    INSURER: Payor: KAYLEN / Plan: KAYLEN PMAP / Product Type: HMO /   ATTN:   Re: Prior Authorization Request  Patient: Jennifer Gonsalez  Policy ID#:  [id number]  : 1975      To Whom it May Concern:    I am writing to formally request a prior authorization of coverage for my patient,  Jennifer Gonsalez, for treatment using Wegovy.  I am requesting authorization for applicable provider professional and facility services associated with this therapy.    The therapy involves weekly injections to aid in weigh loss.      The benefits of the therapy include improved health, diabetes prevention and better blood pressure control.      I have treated Jennifer Gonsalez since  and I have determined that it is medically appropriate for  this patient to receive be treated with Wegovy for the reason(s) stated below:    Obesity considered morbid due to her hypertension    She is unable to use phentermine due to her hypertension and risk of pulmonary hypertension given her asthma.    Patient is at risk for worsening hypertension, diabetes, cardiovascular issues and multiple cancers if her weight is not treated.             I firmly believe that this therapy is clinically appropriate and that Jennifer Gonsalez would benefit from improved [clinical outcomes, quality of life] if allowed the opportunity to receive this treatment.  Please contact me at Dept: [phone] if you require additional information to ensure the prompt approval for coverage.    Please send your written decision to me at this address:  [address] [address]  [phone]  Dept: [phone]  E-mail: [email or web address]      Sincerely,      Myla Obrien MD        Enclosures

## 2024-10-08 NOTE — TELEPHONE ENCOUNTER
Central Prior Authorization Team  Phone: [phone]    PRIOR AUTHORIZATION DENIED    Medication: WEGOVY 0.25 MG/0.5ML SC SOAJ  Insurance Company: [employer] - Phone [phone] Fax [phone]  Denial Date: 10/8/2024  Denial Reason(s):         Appeal Information:         Patient Notified: NO- Unfortunately, we cannot call the patient with denials because we do not know what next steps the MD will take nor can we give medical advice, please notify the patient of what they are to expect for the continuation of their therapy from the provider.

## 2024-10-08 NOTE — TELEPHONE ENCOUNTER
Hello,    PA has been denied, please see denial rationale.    If provider would like to appeal please review the plan's reasons for denial listed. Please utilize that information to complete letter and provide specific, detailed clinical information/rationale of your patient's health status to address their denial reasons and place letter in patient's chart. The encounter can be routed directly back to me to start appeal.    If done with encounter, please notify the patient and close the encounter.    Thank yo

## 2024-11-19 DIAGNOSIS — F41.1 GENERALIZED ANXIETY DISORDER: ICD-10-CM

## 2024-11-19 DIAGNOSIS — N95.1 PERIMENOPAUSAL: ICD-10-CM

## 2024-11-19 RX ORDER — FLUOXETINE 10 MG/1
10 CAPSULE ORAL AT BEDTIME
Qty: 90 CAPSULE | Refills: 0 | Status: SHIPPED | OUTPATIENT
Start: 2024-11-19

## 2024-12-16 ENCOUNTER — TELEPHONE (OUTPATIENT)
Dept: FAMILY MEDICINE | Facility: CLINIC | Age: 49
End: 2024-12-16
Payer: COMMERCIAL

## 2024-12-16 NOTE — TELEPHONE ENCOUNTER
Patient needs a visit to change medication. She also may want to call a [facility] to see if they have stock.    Myla Obrien MD

## 2024-12-16 NOTE — TELEPHONE ENCOUNTER
Called patient and relayed provider message below, patient verbalized understanding - virtual appt scheduled for early next month to discuss increasing dose. In the meantime, patient will reach out to [facility] to see if they have the 0.5 mg dose in stock. If so, will let us know and we can send prescription over. No further questions or concerns.      Aydee Null RN, BSN  [facility]  [place], [place] and [place]

## 2024-12-16 NOTE — TELEPHONE ENCOUNTER
Patient calling to request an increase of the wegovy injection. Patient was on the 0.5 mg. Denies any negative side effects. Reports a 5 pound weight loss.   Would like to increase to the 1 mg. Patient reports it is hard to find the 0.5 mg and the pharmacy does not know when it would be back in stock.   Pharmacy pended.     Patient would like to increase from 0.5mg to 1 mg of wegovy.     Routing to provider to review/advise.     Jeanne MOELLERN,  RN  [facility]

## 2024-12-26 DIAGNOSIS — F51.01 PRIMARY INSOMNIA: ICD-10-CM

## 2024-12-26 RX ORDER — HYDROXYZINE HYDROCHLORIDE 25 MG/1
25-50 TABLET, FILM COATED ORAL
Qty: 90 TABLET | Refills: 1 | Status: SHIPPED | OUTPATIENT
Start: 2024-12-26

## 2025-01-01 DIAGNOSIS — J30.81 ALLERGIC RHINITIS DUE TO ANIMALS: ICD-10-CM

## 2025-01-01 DIAGNOSIS — J45.901 EXACERBATION OF ASTHMA, UNSPECIFIED ASTHMA SEVERITY, UNSPECIFIED WHETHER PERSISTENT: ICD-10-CM

## 2025-01-02 RX ORDER — CETIRIZINE HYDROCHLORIDE 10 MG/1
10 TABLET ORAL DAILY
Qty: 90 TABLET | Refills: 0 | Status: SHIPPED | OUTPATIENT
Start: 2025-01-02

## 2025-01-02 NOTE — TELEPHONE ENCOUNTER
Called and spoke with patient. She takes cetirizine once daily for allergies. 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     Routing to provider to review/advise.     Jeanne MOELLERN,  RN  [facility]

## 2025-01-02 NOTE — TELEPHONE ENCOUNTER
Clinic RN: Please investigate patient's chart or contact patient if the information cannot be found because the medication is listed as historical or discontinued. Confirm patient is taking this medication. Document findings and route refill encounter to provider for approval or denial.    Opal MOELLERN, RN

## 2025-01-06 ENCOUNTER — VIRTUAL VISIT (OUTPATIENT)
Dept: FAMILY MEDICINE | Facility: CLINIC | Age: 50
End: 2025-01-06
Payer: COMMERCIAL

## 2025-01-06 DIAGNOSIS — E66.01 MORBID OBESITY (H): ICD-10-CM

## 2025-01-06 PROCEDURE — 98013 SYNCH AUDIO-ONLY EST LOW 20: CPT | Performed by: FAMILY MEDICINE

## 2025-01-06 NOTE — PROGRESS NOTES
"  If patient has telephone visit, have they been educated on video visit as preferred visit method and offered to change to video visit? yes        Instructions Relayed to Patient by Virtual Roomer:     Patient is active on [employer]:   Relayed following to patient: \"It looks like you are active on [employer], are you able to join the visit this way? If not, do you need us to send you a link now or would you like your provider to send a link via text or email when they are ready to initiate the visit?\"      Patient Confirmed they will join visit via: Provider to call patient for telephone visit   Reminded patient to ensure they were logged on to virtual visit by arrival time listed.   Asked if patient has flexibility to initiate visit sooner than arrival time: patient stated yes, documented in appointment notes availability to initiate visit earlier than arrival time     If pediatric virtual visit, ensured pediatric patient along with parent/guardian will be present for video visit.     Patient offered the website [email or web address] and/or phone number to [employer] Help line: [phone]     Jennifer is a 49 year old who is being evaluated via a billable telephone visit.    What phone number would you like to be contacted at? [phone]  How would you like to obtain your AVS? [employer]  Originating Location (pt. Location): Home    Distant Location (provider location):  On-site  Telephone visit completed due to the patient did not consent to a video visit.    Assessment & Plan     Morbid obesity (H)  Improved appetite management with Wegovy - increase to 1 mg along with 4 serving of protein in a calorie deficit diet and 150 minutes of weight bearing exercises each week.  - Semaglutide-Weight Management (WEGOVY) 1 MG/0.5ML pen; Inject 1 mg subcutaneously once a week.    The uses and side effects, including black box warnings as appropriate, were discussed in detail.  All patient questions were answered.  The " patient was instructed to call immediately if any side effects developed.     Follow up in 3 months.    Cristhian Rodriguez is a 49 year old, presenting for the following health issues:  RECHECK (Meds check in )    History of Present Illness       Reason for visit:  Follow up meds check    She eats 2-3 servings of fruits and vegetables daily.She consumes 1 sweetened beverage(s) daily.She exercises with enough effort to increase her heart rate 9 or less minutes per day.  She exercises with enough effort to increase her heart rate 3 or less days per week.   She is taking medications regularly.       Medication Followup of   Taking Medication as prescribed: yes  Side Effects:  None  Medication Helping Symptoms:  yes        Review of Systems  Constitutional, HEENT, cardiovascular, pulmonary, gi and gu systems are negative, except as otherwise noted.      Objective           Vitals:  No vitals were obtained today due to virtual visit.    Physical Exam   General: Alert and no distress //Respiratory: No audible wheeze, cough, or shortness of breath // Psychiatric:  Appropriate affect, tone, and pace of words            Phone call duration: 15 minutes  Signed Electronically by: Myla Obrien MD

## 2025-01-21 ENCOUNTER — TELEPHONE (OUTPATIENT)
Dept: FAMILY MEDICINE | Facility: CLINIC | Age: 50
End: 2025-01-21
Payer: COMMERCIAL

## 2025-01-21 NOTE — LETTER
January 21, 2025      Jennifer JOSEPH Wallace  [address]  [place] [address]        To Whom It May Concern,     Jennifer Gonsalez 1975 is allergic to cats.  She should not be required to work where the are present.      Sincerely,    Myla Obrien MD    Electronically signed

## 2025-01-21 NOTE — TELEPHONE ENCOUNTER
Forms/Letter Request    Type of form/letter: Work    Have you been seen for this request: Yes Allergy to cats so she doesn't have to go in a room    Do we have the form/letter: No    When is form/letter needed by: ASAP    How would you like the form/letter returned:  [employer]    Patient Notified form requests are processed in 3-5 business days:Yes    Could we send this information to you in [employer] or would you prefer to receive a phone call?:   Patient would like to be contacted via [employer]

## 2025-03-03 ENCOUNTER — MYC REFILL (OUTPATIENT)
Dept: FAMILY MEDICINE | Facility: CLINIC | Age: 50
End: 2025-03-03
Payer: COMMERCIAL

## 2025-03-03 ENCOUNTER — TELEPHONE (OUTPATIENT)
Dept: FAMILY MEDICINE | Facility: CLINIC | Age: 50
End: 2025-03-03
Payer: COMMERCIAL

## 2025-03-03 DIAGNOSIS — N95.1 PERIMENOPAUSAL: ICD-10-CM

## 2025-03-03 DIAGNOSIS — E66.01 MORBID OBESITY (H): ICD-10-CM

## 2025-03-03 DIAGNOSIS — F41.1 GENERALIZED ANXIETY DISORDER: ICD-10-CM

## 2025-03-03 RX ORDER — FLUOXETINE 10 MG/1
10 CAPSULE ORAL AT BEDTIME
Qty: 90 CAPSULE | Refills: 0 | Status: SHIPPED | OUTPATIENT
Start: 2025-03-03

## 2025-03-03 NOTE — TELEPHONE ENCOUNTER
Patient asking for next dose in titration, 1.7 mg of Wegovy.       Last prescribed 1 mg on 1/6/25.     1 mg medication and pharmacy pended.     Routing to provider to review and advise.    Kristina Kjellberg, MSN, RN  [facility] Primary Care Triage

## 2025-03-03 NOTE — TELEPHONE ENCOUNTER
"  Medication Question or Refill    Contacts       Contact Date/Time Type Contact Phone/Fax    03/03/2025 11:10 AM CST Phone (Incoming) Jennifer Gonsalez (Self) [phone] (M)            What medication are you calling about (include dose and sig)?:     Semaglutide-Weight Management (WEGOVY)       Preferred Pharmacy:  [facility] in [place]      Controlled Substance Agreement on file:   CSA -- Patient Level:    CSA: None found at the patient level.       Who prescribed the medication?: Dr Toya Obrien    Do you need a refill? Yes    Patient offered an appointment? No    Do you have any questions or concerns?  Yes: Patient states that she \"needs the Next dose 1.7 mg\"      Could we send this information to you in [facility] or would you prefer to receive a phone call?:   Patient would prefer a phone call   Okay to leave a detailed message?: Yes at Cell number on file:    Telephone Information:   Mobile [phone]       Rhianna Pastor MA/  [facility]   Primary Care    "

## 2025-03-04 NOTE — TELEPHONE ENCOUNTER
Called patient and relayed provider message below, patient aware. Was able to schedule for virtual appt next Wednesday, patient aware of appt time.     Patient did want provider to know that she has 1 dose left of the 1 mg dose (does these on Sundays), but the reason she wanted to increase dose was due to insurance coverage. States her insurance will not cover 4 more doses of the 1 mg dose, they want her to increase to 1.7 mg.     Informed patient we'll let PCP know - if any changes to the plan (as in, any changes to appt need for next week) we'll let her know. Patient aware.      Routing to PCP to review/advise, if no further action needed, can close encounter      Aydee Null RN, BSN  [facility]  [place], [place] and [place]

## 2025-03-12 ENCOUNTER — VIRTUAL VISIT (OUTPATIENT)
Dept: FAMILY MEDICINE | Facility: CLINIC | Age: 50
End: 2025-03-12
Payer: COMMERCIAL

## 2025-03-12 DIAGNOSIS — E66.01 MORBID OBESITY (H): Primary | ICD-10-CM

## 2025-03-12 PROCEDURE — 98014 SYNCH AUDIO-ONLY EST MOD 30: CPT | Performed by: FAMILY MEDICINE

## 2025-03-12 ASSESSMENT — ASTHMA QUESTIONNAIRES
QUESTION_3 LAST FOUR WEEKS HOW OFTEN DID YOUR ASTHMA SYMPTOMS (WHEEZING, COUGHING, SHORTNESS OF BREATH, CHEST TIGHTNESS OR PAIN) WAKE YOU UP AT NIGHT OR EARLIER THAN USUAL IN THE MORNING: NOT AT ALL
EMERGENCY_ROOM_LAST_YEAR_TOTAL: ONE
QUESTION_5 LAST FOUR WEEKS HOW WOULD YOU RATE YOUR ASTHMA CONTROL: COMPLETELY CONTROLLED
ACT_TOTALSCORE: 25
QUESTION_1 LAST FOUR WEEKS HOW MUCH OF THE TIME DID YOUR ASTHMA KEEP YOU FROM GETTING AS MUCH DONE AT WORK, SCHOOL OR AT HOME: NONE OF THE TIME
QUESTION_4 LAST FOUR WEEKS HOW OFTEN HAVE YOU USED YOUR RESCUE INHALER OR NEBULIZER MEDICATION (SUCH AS ALBUTEROL): NOT AT ALL
QUESTION_2 LAST FOUR WEEKS HOW OFTEN HAVE YOU HAD SHORTNESS OF BREATH: NOT AT ALL
ACT_TOTALSCORE: 25

## 2025-03-12 NOTE — PROGRESS NOTES
Jennifer is a 49 year old who is being evaluated via a billable telephone visit.    What phone number would you like to be contacted at? [phone]  How would you like to obtain your AVS? [employer]  Originating Location (pt. Location): Home    Distant Location (provider location):  On-site  Telephone visit completed due to the patient did not consent to a video visit.    Assessment & Plan     Morbid obesity (H)  Weight plateau - increase dose along with protein focused calorie deficit diet and 150 minutes of exercise weekly  - Semaglutide-Weight Management (WEGOVY) 1.7 MG/0.75ML pen; Inject 1.7 mg subcutaneously once a week.  - PRIMARY CARE FOLLOW-UP SCHEDULING; Future      The uses and side effects, including black box warnings as appropriate, were discussed in detail.  All patient questions were answered.  The patient was instructed to call immediately if any side effects developed.     Follow up in 4 months.    Subjective   Jennifer is a 49 year old, presenting for the following health issues:  Recheck Medication        3/12/2025     9:30 AM   Additional Questions   Roomed by Genoveva     History of Present Illness       Reason for visit:  Wegovy  Symptom onset:  Today  Symptoms include:  Discuss dosage of wegovy because insurance wants to up the dosage  Symptom intensity:  Mild  Symptom progression:  Staying the same  Had these symptoms before:  No   She is taking medications regularly.      Denies stomach pain or constipation.            Review of Systems  Constitutional, HEENT, cardiovascular, pulmonary, gi and gu systems are negative, except as otherwise noted.      Objective    Vitals - Patient Reported  Weight (Patient Reported): 92.5 kg (204 lb)      Vitals:  No vitals were obtained today due to virtual visit.    Physical Exam   General: Alert and no distress //Respiratory: No audible wheeze, cough, or shortness of breath // Psychiatric:  Appropriate affect, tone, and pace of words            Phone call duration:  10 minutes  Signed Electronically by: Myla Obrien MD

## 2025-03-24 ENCOUNTER — TELEPHONE (OUTPATIENT)
Dept: FAMILY MEDICINE | Facility: CLINIC | Age: 50
End: 2025-03-24
Payer: COMMERCIAL

## 2025-03-24 NOTE — TELEPHONE ENCOUNTER
Patient Quality Outreach    Patient is due for the following:   Breast Cancer Screening - Mammogram  Cervical Cancer Screening - PAP Needed  Physical Preventive Adult Physical      Topic Date Due    Pneumococcal Vaccine (1 of 2 - PCV) Never done    Hepatitis B Vaccine (1 of 3 - 19+ 3-dose series) Never done    Flu Vaccine (1) 09/01/2024    COVID-19 Vaccine (3 - 2024-25 season) 09/01/2024       Action(s) Taken:   Patient has upcoming appointment, these items will be addressed at that time. Appointment scheduled for 5/12/25 with PCP.    Type of outreach:    Chart review performed, no outreach needed.    Questions for provider review:    None         Indira Short MA  Chart routed to .

## 2025-03-31 DIAGNOSIS — J30.81 ALLERGIC RHINITIS DUE TO ANIMALS: ICD-10-CM

## 2025-03-31 DIAGNOSIS — J45.901 EXACERBATION OF ASTHMA, UNSPECIFIED ASTHMA SEVERITY, UNSPECIFIED WHETHER PERSISTENT: ICD-10-CM

## 2025-03-31 RX ORDER — CETIRIZINE HYDROCHLORIDE 10 MG/1
10 TABLET ORAL DAILY
Qty: 90 TABLET | Refills: 0 | Status: SHIPPED | OUTPATIENT
Start: 2025-03-31

## 2025-04-19 ENCOUNTER — HEALTH MAINTENANCE LETTER (OUTPATIENT)
Age: 50
End: 2025-04-19

## 2025-05-06 ENCOUNTER — TELEPHONE (OUTPATIENT)
Dept: FAMILY MEDICINE | Facility: CLINIC | Age: 50
End: 2025-05-06
Payer: COMMERCIAL

## 2025-05-06 NOTE — TELEPHONE ENCOUNTER
Prior Authorization Retail Medication Request    Medication/Dose: Wegovy  Diagnosis and ICD code (if different than what is on RX):    New/renewal/insurance change PA/secondary ins. PA:  Previously Tried and Failed:    Rationale:      Insurance   Primary: ariel mccray  Insurance ID:  [id number]    Secondary (if applicable):  Insurance ID:      Pharmacy Information (if different than what is on RX)  Name:    Phone:    Fax:    Clinic Information  Preferred routing pool for dept communication:

## 2025-05-12 ENCOUNTER — VIRTUAL VISIT (OUTPATIENT)
Dept: FAMILY MEDICINE | Facility: CLINIC | Age: 50
End: 2025-05-12
Attending: FAMILY MEDICINE
Payer: COMMERCIAL

## 2025-05-12 DIAGNOSIS — N95.1 PERIMENOPAUSAL: ICD-10-CM

## 2025-05-12 DIAGNOSIS — F41.1 GENERALIZED ANXIETY DISORDER: ICD-10-CM

## 2025-05-12 DIAGNOSIS — F51.01 PRIMARY INSOMNIA: ICD-10-CM

## 2025-05-12 DIAGNOSIS — E66.01 MORBID OBESITY (H): ICD-10-CM

## 2025-05-12 PROCEDURE — 98014 SYNCH AUDIO-ONLY EST MOD 30: CPT | Performed by: FAMILY MEDICINE

## 2025-05-12 RX ORDER — HYDROXYZINE HYDROCHLORIDE 25 MG/1
25-50 TABLET, FILM COATED ORAL
Qty: 90 TABLET | Refills: 1 | Status: SHIPPED | OUTPATIENT
Start: 2025-05-12

## 2025-05-12 RX ORDER — FLUOXETINE 10 MG/1
10 CAPSULE ORAL AT BEDTIME
Qty: 90 CAPSULE | Refills: 0 | Status: SHIPPED | OUTPATIENT
Start: 2025-05-12

## 2025-05-12 RX ORDER — FLUOXETINE 10 MG/1
10 CAPSULE ORAL AT BEDTIME
Qty: 90 CAPSULE | Refills: 0 | Status: SHIPPED | OUTPATIENT
Start: 2025-05-12 | End: 2025-05-12

## 2025-05-12 RX ORDER — HYDROXYZINE HYDROCHLORIDE 25 MG/1
25-50 TABLET, FILM COATED ORAL
Qty: 90 TABLET | Refills: 1 | Status: SHIPPED | OUTPATIENT
Start: 2025-05-12 | End: 2025-05-12

## 2025-05-12 NOTE — TELEPHONE ENCOUNTER
Medication Question or Refill        What medication are you calling about (include dose and sig)?: FLUoxetine (PROZAC) 10 MG capsule   hydrOXYzine HCl (ATARAX) 25 MG tablet   Semaglutide-Weight Management (WEGOVY) 2.4 MG/0.75ML pen [[id number]]   Preferred Pharmacy:    [place], [place] - [address]  [address]  [facility] [address]  Phone: [phone] Fax: [phone]      Controlled Substance Agreement on file:   CSA -- Patient Level:    CSA: None found at the patient level.       Who prescribed the medication?: Myla Davies MD     Do you need a refill? No    Do you have any questions or concerns?  Yes: Patient requesting medications listed above be transferred to pended pharmacy.      Could we send this information to you in [facility] or would you prefer to receive a phone call?:   Patient would prefer a phone call   Okay to leave a detailed message?: Yes at Cell number on file:    Telephone Information:   Mobile [phone]

## 2025-05-12 NOTE — PROGRESS NOTES
"Jennifer is a 49 year old who is being evaluated via a billable telephone visit.    What phone number would you like to be contacted at? [phone]  How would you like to obtain your AVS? [employer]  Originating Location (pt. Location): Other work    Distant Location (provider location):  On-site  Telephone visit completed due to the patient did not consent to a video visit.    Assessment & Plan     Morbid obesity (H)  Increase dose and continue diet and exercise changes.  - PRIMARY CARE FOLLOW-UP SCHEDULING  - Semaglutide-Weight Management (WEGOVY) 2.4 MG/0.75ML pen; Inject 2.4 mg subcutaneously once a week.    Generalized anxiety disorder  Stable - continue current dose  - FLUoxetine (PROZAC) 10 MG capsule; Take 1 capsule (10 mg) by mouth at bedtime.    Perimenopausal  Stable - continue current dose  - FLUoxetine (PROZAC) 10 MG capsule; Take 1 capsule (10 mg) by mouth at bedtime.    Primary insomnia  Stable - continue current treatment  - hydrOXYzine HCl (ATARAX) 25 MG tablet; Take 1-2 tablets (25-50 mg) by mouth nightly as needed (for sleep).          BMI  Estimated body mass index is 37.66 kg/m  as calculated from the following:    Height as of 9/23/24: 1.613 m (5' 3.5\").    Weight as of 9/23/24: 98 kg (216 lb).   Weight management plan: Discussed healthy diet and exercise guidelines      Follow-up    Follow-up Visit   Expected date:  Aug 12, 2025 (Approximate)      Follow Up Appointment Details:     Follow-up with whom?: Me    Follow-Up for what?: Chronic Disease f/u    Chronic Disease f/u: General (Other)    Additional Details: Weight management    How?: In Person or Virtual                 Subjective   Jennifer is a 49 year old, presenting for the following health issues:  Recheck Medication      5/12/2025    11:02 AM   Additional Questions   Roomed by Cheryl   Accompanied by self     History of Present Illness       Reason for visit:  Follow up She is missing 2 dose(s) of medications per week.  She is not taking " "prescribed medications regularly due to other.        Medication Followup of Wegovy  Taking Medication as prescribed: yes  Side Effects:  None  Medication Helping Symptoms:  yes  Getting 10,000 steps per day.  Focused on clean protein meals.  Goal weigh is 160 lb        Review of Systems  Constitutional, HEENT, cardiovascular, pulmonary, gi and gu systems are negative, except as otherwise noted.      Objective           Vitals:  Weight 190 lb  Hgt 5' 3.5\"    Physical Exam   General: Alert and no distress //Respiratory: No audible wheeze, cough, or shortness of breath // Psychiatric:  Appropriate affect, tone, and pace of words            Phone call duration: 10 minutes  Signed Electronically by: Myla Obrien MD        If patient has telephone visit, have they been educated on video visit as preferred visit method and offered to change to video visit? yes        Instructions Relayed to Patient by Virtual Roomer:     Patient instructed that provider will initiate telephone visit via phone call      Patient Confirmed they will join visit via: Provider to call patient for telephone visit   Reminded patient to ensure they were logged on to virtual visit by arrival time listed.   Asked if patient has flexibility to initiate visit sooner than arrival time: patient stated yes, documented in appointment notes availability to initiate visit earlier than arrival time     If pediatric virtual visit, ensured pediatric patient along with parent/guardian will be present for video visit.     Patient offered the website [email or web address] and/or phone number to [employer] Help line: [phone]   "

## 2025-05-13 NOTE — TELEPHONE ENCOUNTER
Prior Authorization Approval    Medication: SEMAGLUTIDE-WEIGHT MANAGEMENT 1.7 MG/0.75ML SC SOAJ  Authorization Effective Date: 5/13/2025  Authorization Expiration Date: 5/13/2026  Approved Dose/Quantity: as written  Reference #: [id number]   Insurance Company: [employer] - Phone [phone] Fax [phone]  Which Pharmacy is filling the prescription: [facility] [place], [place] - [address], [address]  Pharmacy Notified: y  Patient Notified: Instructed pharmacy to notify patient once order is ready.

## 2025-05-13 NOTE — TELEPHONE ENCOUNTER
PA Initiation    Medication: SEMAGLUTIDE-WEIGHT MANAGEMENT 1.7 MG/0.75ML SC SOAJ  Insurance Company: [employer] - Phone [phone] Fax [phone]  Pharmacy Filling the Rx: [facility] - [place], [place] - [address] [place] [address], [address]  Filling Pharmacy Phone: [phone]  Filling Pharmacy Fax: [phone]  Start Date: 5/9/2025

## 2025-06-06 ENCOUNTER — MYC REFILL (OUTPATIENT)
Dept: FAMILY MEDICINE | Facility: CLINIC | Age: 50
End: 2025-06-06
Payer: COMMERCIAL

## 2025-06-06 DIAGNOSIS — E66.01 MORBID OBESITY (H): ICD-10-CM

## 2025-06-28 DIAGNOSIS — J30.81 ALLERGIC RHINITIS DUE TO ANIMALS: ICD-10-CM

## 2025-06-28 DIAGNOSIS — J45.901 EXACERBATION OF ASTHMA, UNSPECIFIED ASTHMA SEVERITY, UNSPECIFIED WHETHER PERSISTENT: ICD-10-CM

## 2025-06-30 RX ORDER — CETIRIZINE HYDROCHLORIDE 10 MG/1
10 TABLET ORAL DAILY
Qty: 90 TABLET | Refills: 3 | Status: SHIPPED | OUTPATIENT
Start: 2025-06-30

## 2025-07-12 ENCOUNTER — HEALTH MAINTENANCE LETTER (OUTPATIENT)
Age: 50
End: 2025-07-12

## 2025-07-28 ENCOUNTER — MYC REFILL (OUTPATIENT)
Dept: FAMILY MEDICINE | Facility: CLINIC | Age: 50
End: 2025-07-28
Payer: COMMERCIAL

## 2025-07-28 DIAGNOSIS — F51.01 PRIMARY INSOMNIA: ICD-10-CM

## 2025-07-28 DIAGNOSIS — E66.01 MORBID OBESITY (H): ICD-10-CM

## 2025-07-28 RX ORDER — HYDROXYZINE HYDROCHLORIDE 25 MG/1
25-50 TABLET, FILM COATED ORAL
Qty: 90 TABLET | Refills: 1 | OUTPATIENT
Start: 2025-07-28

## 2025-08-13 ENCOUNTER — TELEPHONE (OUTPATIENT)
Dept: FAMILY MEDICINE | Facility: CLINIC | Age: 50
End: 2025-08-13

## 2025-08-13 ENCOUNTER — VIRTUAL VISIT (OUTPATIENT)
Dept: FAMILY MEDICINE | Facility: CLINIC | Age: 50
End: 2025-08-13
Attending: FAMILY MEDICINE
Payer: COMMERCIAL

## 2025-08-13 DIAGNOSIS — F41.1 GENERALIZED ANXIETY DISORDER: ICD-10-CM

## 2025-08-13 DIAGNOSIS — E66.01 MORBID OBESITY (H): ICD-10-CM

## 2025-08-13 DIAGNOSIS — N95.1 PERIMENOPAUSAL: ICD-10-CM

## 2025-08-13 PROCEDURE — 98014 SYNCH AUDIO-ONLY EST MOD 30: CPT | Performed by: FAMILY MEDICINE

## 2025-08-13 RX ORDER — FLUOXETINE 10 MG/1
10 CAPSULE ORAL AT BEDTIME
Qty: 90 CAPSULE | Refills: 1 | Status: SHIPPED | OUTPATIENT
Start: 2025-08-13

## 2025-08-13 RX ORDER — FLUOXETINE 10 MG/1
10 CAPSULE ORAL AT BEDTIME
Qty: 90 CAPSULE | Refills: 1 | Status: SHIPPED | OUTPATIENT
Start: 2025-08-13 | End: 2025-08-13